# Patient Record
Sex: MALE | Race: WHITE | NOT HISPANIC OR LATINO | Employment: FULL TIME | URBAN - METROPOLITAN AREA
[De-identification: names, ages, dates, MRNs, and addresses within clinical notes are randomized per-mention and may not be internally consistent; named-entity substitution may affect disease eponyms.]

---

## 2023-02-21 PROBLEM — D75.89 MACROCYTOSIS: Status: ACTIVE | Noted: 2023-02-21

## 2023-02-21 PROBLEM — J32.9 CHRONIC SINUSITIS: Status: ACTIVE | Noted: 2023-02-21

## 2023-02-21 PROBLEM — R73.01 IFG (IMPAIRED FASTING GLUCOSE): Status: ACTIVE | Noted: 2023-02-21

## 2023-02-21 PROBLEM — I49.3 PVC (PREMATURE VENTRICULAR CONTRACTION): Status: ACTIVE | Noted: 2023-02-21

## 2023-02-21 PROBLEM — E78.2 HYPERLIPIDEMIA, MIXED: Status: ACTIVE | Noted: 2023-02-21

## 2023-02-21 PROBLEM — N52.9 ERECTILE DYSFUNCTION: Status: ACTIVE | Noted: 2023-02-21

## 2023-02-21 PROBLEM — J45.990 EXERCISE-INDUCED ASTHMA (HHS-HCC): Status: ACTIVE | Noted: 2023-02-21

## 2023-02-21 PROBLEM — K64.0 GRADE I HEMORRHOIDS: Status: ACTIVE | Noted: 2023-02-21

## 2023-02-21 PROBLEM — J30.9 ALLERGIC RHINITIS: Status: ACTIVE | Noted: 2023-02-21

## 2023-02-21 PROBLEM — G47.33 MILD OBSTRUCTIVE SLEEP APNEA: Status: ACTIVE | Noted: 2023-02-21

## 2023-02-21 PROBLEM — I47.10 PSVT (PAROXYSMAL SUPRAVENTRICULAR TACHYCARDIA) (CMS-HCC): Status: ACTIVE | Noted: 2023-02-21

## 2023-02-21 PROBLEM — E55.9 VITAMIN D DEFICIENCY: Status: ACTIVE | Noted: 2023-02-21

## 2023-02-21 PROBLEM — R93.1 ELEVATED CORONARY ARTERY CALCIUM SCORE: Status: ACTIVE | Noted: 2023-02-21

## 2023-02-21 PROBLEM — I10 HYPERTENSION, ESSENTIAL: Status: ACTIVE | Noted: 2023-02-21

## 2023-02-21 RX ORDER — NEBIVOLOL 5 MG/1
1 TABLET ORAL DAILY
COMMUNITY
End: 2024-02-09

## 2023-02-21 RX ORDER — ATORVASTATIN CALCIUM 20 MG/1
1 TABLET, FILM COATED ORAL DAILY
COMMUNITY
Start: 2019-10-10 | End: 2024-03-01 | Stop reason: SDUPTHER

## 2023-02-21 RX ORDER — SILDENAFIL 25 MG/1
1 TABLET, FILM COATED ORAL DAILY PRN
COMMUNITY
End: 2024-04-24

## 2023-02-21 RX ORDER — MONTELUKAST SODIUM 10 MG/1
1 TABLET ORAL DAILY
COMMUNITY
Start: 2016-01-16 | End: 2023-09-16 | Stop reason: SDUPTHER

## 2023-02-21 RX ORDER — VIT C/E/ZN/COPPR/LUTEIN/ZEAXAN 250MG-90MG
2 CAPSULE ORAL DAILY
COMMUNITY
Start: 2018-10-02

## 2023-02-21 RX ORDER — FLUTICASONE PROPIONATE 50 MCG
1 SPRAY, SUSPENSION (ML) NASAL DAILY
COMMUNITY
Start: 2018-10-02

## 2023-02-21 RX ORDER — ACETAMINOPHEN, DEXTROMETHORPHAN HBR, DOXYLAMINE SUCCINATE, PHENYLEPHRINE HCL 650; 20; 12.5; 1 MG/30ML; MG/30ML; MG/30ML; MG/30ML
1 SOLUTION ORAL DAILY
COMMUNITY
Start: 2018-10-02

## 2023-02-21 RX ORDER — ALBUTEROL SULFATE 90 UG/1
2 AEROSOL, METERED RESPIRATORY (INHALATION)
COMMUNITY
Start: 2016-01-16 | End: 2023-09-12 | Stop reason: SDUPTHER

## 2023-02-21 RX ORDER — METOPROLOL SUCCINATE 50 MG/1
0.5 TABLET, EXTENDED RELEASE ORAL
COMMUNITY
End: 2023-03-30 | Stop reason: ALTCHOICE

## 2023-03-30 ENCOUNTER — OFFICE VISIT (OUTPATIENT)
Dept: PRIMARY CARE | Facility: CLINIC | Age: 53
End: 2023-03-30
Payer: COMMERCIAL

## 2023-03-30 VITALS
SYSTOLIC BLOOD PRESSURE: 130 MMHG | HEART RATE: 57 BPM | WEIGHT: 222 LBS | OXYGEN SATURATION: 98 % | BODY MASS INDEX: 29.29 KG/M2 | DIASTOLIC BLOOD PRESSURE: 84 MMHG | TEMPERATURE: 97.2 F

## 2023-03-30 DIAGNOSIS — N52.9 ERECTILE DYSFUNCTION, UNSPECIFIED ERECTILE DYSFUNCTION TYPE: ICD-10-CM

## 2023-03-30 DIAGNOSIS — R73.01 IFG (IMPAIRED FASTING GLUCOSE): ICD-10-CM

## 2023-03-30 DIAGNOSIS — I10 HYPERTENSION, ESSENTIAL: ICD-10-CM

## 2023-03-30 DIAGNOSIS — E55.9 VITAMIN D DEFICIENCY: ICD-10-CM

## 2023-03-30 DIAGNOSIS — E78.2 HYPERLIPIDEMIA, MIXED: Primary | ICD-10-CM

## 2023-03-30 PROBLEM — Z71.89 CARDIAC RISK COUNSELING: Status: ACTIVE | Noted: 2023-03-30

## 2023-03-30 PROBLEM — Z00.00 ROUTINE HEALTH MAINTENANCE: Status: ACTIVE | Noted: 2023-03-30

## 2023-03-30 PROCEDURE — 1036F TOBACCO NON-USER: CPT | Performed by: NURSE PRACTITIONER

## 2023-03-30 PROCEDURE — 3079F DIAST BP 80-89 MM HG: CPT | Performed by: NURSE PRACTITIONER

## 2023-03-30 PROCEDURE — 99214 OFFICE O/P EST MOD 30 MIN: CPT | Performed by: NURSE PRACTITIONER

## 2023-03-30 PROCEDURE — 3075F SYST BP GE 130 - 139MM HG: CPT | Performed by: NURSE PRACTITIONER

## 2023-03-30 ASSESSMENT — PATIENT HEALTH QUESTIONNAIRE - PHQ9
2. FEELING DOWN, DEPRESSED OR HOPELESS: NOT AT ALL
1. LITTLE INTEREST OR PLEASURE IN DOING THINGS: NOT AT ALL
SUM OF ALL RESPONSES TO PHQ9 QUESTIONS 1 AND 2: 0

## 2023-03-30 NOTE — PROGRESS NOTES
"Subjective   Patient ID: Tha Short is a 53 y.o. male who presents for Follow-up (Follow up visit/Had labs/mri/Brought bp machine from home/Feeling good/ /Patient brought his machine from home to compare/Our machine 130/84 P 57/His machine  132/87 P57/).  HPI  Home readings usually lower at home  127/84  127/81  HR 50s  Feeling good on change to bystolic    Bystolic am  Checking in evenings  Drinks a lot of coffee 4 cups daily   Trying to cut down on red meat  Travels a lot  - restaurant food  Needs to restart exercise     Review of Systems   All other systems reviewed and are negative.      BP Readings from Last 3 Encounters:   03/30/23 130/84   06/24/22 127/87   12/01/21 122/75      Wt Readings from Last 3 Encounters:   03/30/23 101 kg (222 lb)   06/24/22 96.2 kg (212 lb)   05/20/22 93 kg (205 lb)      BMI: Estimated body mass index is 29.29 kg/m² as calculated from the following:    Height as of 6/24/22: 1.854 m (6' 1\").    Weight as of this encounter: 101 kg (222 lb).    Component      Latest Ref Rng 10/26/2022   WBC      4.4 - 11.3 x10E9/L 6.2    RBC      4.50 - 5.90 x10E12/L 4.52    HEMOGLOBIN      13.5 - 17.5 g/dL 15.1    HEMATOCRIT      41.0 - 52.0 % 46.2    MCV      80 - 100 fL 102 (H)    MCHC      32.0 - 36.0 g/dL 32.7    Platelets      150 - 450 x10E9/L 221    RED CELL DISTRIBUTION WIDTH      11.5 - 14.5 % 12.2    Neutrophils %      40.0 - 80.0 % 60.1    Immature Granulocytes %, Automated      0.0 - 0.9 % 0.5    Lymphocytes %      13.0 - 44.0 % 23.1    Monocytes %      2.0 - 10.0 % 12.2    Eosinophils %      0.0 - 6.0 % 3.0    Basophils %      0.0 - 2.0 % 1.1    Neutrophils Absolute      1.20 - 7.70 x10E9/L 3.74    Lymphocytes Absolute      1.20 - 4.80 x10E9/L 1.44    Monocytes Absolute      0.10 - 1.00 x10E9/L 0.76    Eosinophils Absolute      0.00 - 0.70 x10E9/L 0.19    Basophils Absolute      0.00 - 0.10 x10E9/L 0.07    GLUCOSE      74 - 99 mg/dL 89    SODIUM      136 - 145 mmol/L 137    POTASSIUM     "  3.5 - 5.3 mmol/L 4.4    CHLORIDE      98 - 107 mmol/L 104    Bicarbonate      21 - 32 mmol/L 27    Anion Gap      10 - 20 mmol/L 10    Blood Urea Nitrogen      6 - 23 mg/dL 14    Creatinine      0.50 - 1.30 mg/dL 1.03    GFR MALE      >90 mL/min/1.73m2 87    Calcium      8.6 - 10.3 mg/dL 9.2    Albumin      3.4 - 5.0 g/dL 4.1    Alkaline Phosphatase      33 - 120 U/L 67    Total Protein      6.4 - 8.2 g/dL 6.5    AST      9 - 39 U/L 27    Bilirubin Total      0.0 - 1.2 mg/dL 0.6    ALT      10 - 52 U/L 26    Color, Urine      STRAW,YELLOW  STRAW    Appearance, Urine      CLEAR  CLEAR    Specific Gravity, Urine      1.005 - 1.035  1.008    pH, Urine      5.0 - 8.0  6.0    Protein, Urine      NEGATIVE mg/dL NEGATIVE    Glucose, Urine      NEGATIVE mg/dL NEGATIVE    Blood, Urine      NEGATIVE  NEGATIVE    Ketones, Urine      NEGATIVE mg/dL NEGATIVE    Bilirubin, Urine      NEGATIVE  NEGATIVE    Urobilinogen, Urine      0.0 - 1.9 mg/dL <2.0    Nitrite, Urine      NEGATIVE  NEGATIVE    Leukocyte Esterase, Urine      NEGATIVE  NEGATIVE    CHOLESTEROL      0 - 199 mg/dL 126    HDL CHOLESTEROL      mg/dL 47.0    Cholesterol/HDL Ratio 2.7    LDL      0 - 99 mg/dL 53    VLDL      0 - 40 mg/dL 26    TRIGLYCERIDES      0 - 149 mg/dL 130    ALBUMIN (MG/L) IN URINE      Not Established mg/L <7.0    Albumin/Creatine Ratio      0.0 - 30.0 ug/mg crt SEE COMMENT    Creatinine, Urine Random      20.0 - 370.0 mg/dL 57.0    Hemoglobin A1C      % 5.2    Estimated Average Glucose      MG/    Thyroxine, Free      0.61 - 1.12 ng/dL 0.87    Thyroid Stimulating Hormone      0.44 - 3.98 mIU/L 2.76    Triiodothyronine, Free      2.3 - 4.2 pg/mL 3.6    Vitamin D, 25-Hydroxy, Total      ng/mL 49    Prostate Specific Antigen,Screen      0.00 - 4.00 ng/mL    Vitamin B12      211 - 911 pg/mL    FOLATE      >5.0 ng/mL      Component      Latest Ref Rng 2/15/2023   WBC      4.4 - 11.3 x10E9/L 7.9    RBC      4.50 - 5.90 x10E12/L 4.33 (L)     HEMOGLOBIN      13.5 - 17.5 g/dL 14.5    HEMATOCRIT      41.0 - 52.0 % 44.4    MCV      80 - 100 fL 103 (H)    MCHC      32.0 - 36.0 g/dL 32.7    Platelets      150 - 450 x10E9/L 265    RED CELL DISTRIBUTION WIDTH      11.5 - 14.5 % 12.1    Neutrophils %      40.0 - 80.0 % 64.0    Immature Granulocytes %, Automated      0.0 - 0.9 % 0.4    Lymphocytes %      13.0 - 44.0 % 22.4    Monocytes %      2.0 - 10.0 % 10.0    Eosinophils %      0.0 - 6.0 % 2.4    Basophils %      0.0 - 2.0 % 0.8    Neutrophils Absolute      1.20 - 7.70 x10E9/L 5.08    Lymphocytes Absolute      1.20 - 4.80 x10E9/L 1.78    Monocytes Absolute      0.10 - 1.00 x10E9/L 0.79    Eosinophils Absolute      0.00 - 0.70 x10E9/L 0.19    Basophils Absolute      0.00 - 0.10 x10E9/L 0.06    GLUCOSE      74 - 99 mg/dL    SODIUM      136 - 145 mmol/L    POTASSIUM      3.5 - 5.3 mmol/L    CHLORIDE      98 - 107 mmol/L    Bicarbonate      21 - 32 mmol/L    Anion Gap      10 - 20 mmol/L    Blood Urea Nitrogen      6 - 23 mg/dL    Creatinine      0.50 - 1.30 mg/dL    GFR MALE      >90 mL/min/1.73m2    Calcium      8.6 - 10.3 mg/dL    Albumin      3.4 - 5.0 g/dL    Alkaline Phosphatase      33 - 120 U/L    Total Protein      6.4 - 8.2 g/dL    AST      9 - 39 U/L    Bilirubin Total      0.0 - 1.2 mg/dL    ALT      10 - 52 U/L    Color, Urine      STRAW,YELLOW     Appearance, Urine      CLEAR     Specific Gravity, Urine      1.005 - 1.035     pH, Urine      5.0 - 8.0     Protein, Urine      NEGATIVE mg/dL    Glucose, Urine      NEGATIVE mg/dL    Blood, Urine      NEGATIVE     Ketones, Urine      NEGATIVE mg/dL    Bilirubin, Urine      NEGATIVE     Urobilinogen, Urine      0.0 - 1.9 mg/dL    Nitrite, Urine      NEGATIVE     Leukocyte Esterase, Urine      NEGATIVE     CHOLESTEROL      0 - 199 mg/dL    HDL CHOLESTEROL      mg/dL    Cholesterol/HDL Ratio    LDL      0 - 99 mg/dL    VLDL      0 - 40 mg/dL    TRIGLYCERIDES      0 - 149 mg/dL    ALBUMIN (MG/L) IN URINE       Not Established mg/L    Albumin/Creatine Ratio      0.0 - 30.0 ug/mg crt    Creatinine, Urine Random      20.0 - 370.0 mg/dL    Hemoglobin A1C      %    Estimated Average Glucose      MG/DL    Thyroxine, Free      0.61 - 1.12 ng/dL    Thyroid Stimulating Hormone      0.44 - 3.98 mIU/L    Triiodothyronine, Free      2.3 - 4.2 pg/mL    Vitamin D, 25-Hydroxy, Total      ng/mL    Prostate Specific Antigen,Screen      0.00 - 4.00 ng/mL 0.27    Vitamin B12      211 - 911 pg/mL 907    FOLATE      >5.0 ng/mL 18.0         Objective   Physical Exam  Constitutional:       Appearance: Normal appearance.   HENT:      Head: Normocephalic and atraumatic.   Eyes:      Conjunctiva/sclera: Conjunctivae normal.   Cardiovascular:      Rate and Rhythm: Normal rate and regular rhythm.      Heart sounds: Normal heart sounds.   Pulmonary:      Effort: Pulmonary effort is normal.      Breath sounds: Normal breath sounds.   Abdominal:      Palpations: Abdomen is soft.   Musculoskeletal:         General: Normal range of motion.      Cervical back: Normal range of motion.   Skin:     General: Skin is warm and dry.   Neurological:      General: No focal deficit present.      Mental Status: He is alert.   Psychiatric:         Mood and Affect: Mood normal.         Assessment/Plan   Problem List Items Addressed This Visit       Erectile dysfunction    Hyperlipidemia, mixed    Hypertension, essential    IFG (impaired fasting glucose)    Routine health maintenance - Primary     Cscope: 2019 (5y)         Vitamin D deficiency

## 2023-03-30 NOTE — ASSESSMENT & PLAN NOTE
Home cuff checked  Home readings reviewed  Advised cut down on caffeine intake  Cont bystolic and monitor

## 2023-05-18 DIAGNOSIS — K57.90 DIVERTICULOSIS: Primary | ICD-10-CM

## 2023-05-18 RX ORDER — AMOXICILLIN AND CLAVULANATE POTASSIUM 875; 125 MG/1; MG/1
875 TABLET, FILM COATED ORAL 2 TIMES DAILY
Qty: 14 TABLET | Refills: 1 | Status: SHIPPED | OUTPATIENT
Start: 2023-05-18 | End: 2023-05-25

## 2023-07-11 ENCOUNTER — TELEPHONE (OUTPATIENT)
Dept: PRIMARY CARE | Facility: CLINIC | Age: 53
End: 2023-07-11
Payer: COMMERCIAL

## 2023-07-11 NOTE — TELEPHONE ENCOUNTER
Type of form: Physicians Order CPAP supplies  Received from: [employer] via fax for completion and provider's signature   Fax to [phone]  Form placed in PCP box front office.

## 2023-07-27 ENCOUNTER — TELEPHONE (OUTPATIENT)
Dept: PRIMARY CARE | Facility: CLINIC | Age: 53
End: 2023-07-27

## 2023-07-27 ENCOUNTER — TELEMEDICINE (OUTPATIENT)
Dept: PRIMARY CARE | Facility: CLINIC | Age: 53
End: 2023-07-27
Payer: COMMERCIAL

## 2023-07-27 VITALS — WEIGHT: 205 LBS | DIASTOLIC BLOOD PRESSURE: 70 MMHG | BODY MASS INDEX: 27.8 KG/M2 | SYSTOLIC BLOOD PRESSURE: 130 MMHG

## 2023-07-27 DIAGNOSIS — H10.32 ACUTE BACTERIAL CONJUNCTIVITIS OF LEFT EYE: Primary | ICD-10-CM

## 2023-07-27 PROCEDURE — 99213 OFFICE O/P EST LOW 20 MIN: CPT | Performed by: NURSE PRACTITIONER

## 2023-07-27 RX ORDER — POLYMYXIN B SULFATE AND TRIMETHOPRIM 1; 10000 MG/ML; [USP'U]/ML
1 SOLUTION OPHTHALMIC
Qty: 10 ML | Refills: 0 | Status: SHIPPED | OUTPATIENT
Start: 2023-07-27 | End: 2023-08-03

## 2023-07-27 ASSESSMENT — PATIENT HEALTH QUESTIONNAIRE - PHQ9
2. FEELING DOWN, DEPRESSED OR HOPELESS: NOT AT ALL
SUM OF ALL RESPONSES TO PHQ9 QUESTIONS 1 AND 2: 0
1. LITTLE INTEREST OR PLEASURE IN DOING THINGS: NOT AT ALL

## 2023-07-27 NOTE — PROGRESS NOTES
Problem List Items Addressed This Visit    None     An interactive audio/visual telecommunication system which permits real time communications between the patient (at the originating site) and provider (at the distant site) was utilized to provide this telehealth service.    Verbal consent was requested and obtained from the patient for this telehealth visit.  We have confirmed the patient wishes to see me, Lilo Srinivasan, a board certified family nurse practitioner with an active [facility] license as well as verified the patient's identity and physical location in [place].    Subjective   Patient ID: Tha Short is a 53 y.o. male who presents for URI (Allergy symptoms/Eyes started itching yesterday, putting otc drops in/Woke up this morning eye was crusty, has drainage, does not hurt).  URI     Conjunctivitis   Associated symptoms include URI.     OTC drops Clear Eye last night  L eye improved after drops  L > R  Itchy dull pain  Entire L sclera red this morning  Yellowish/brown drainage this morning   Review of Systems   All other systems reviewed and are negative.      BP Readings from Last 3 Encounters:   07/27/23 130/70   03/30/23 130/84   02/16/23 144/86      Wt Readings from Last 3 Encounters:   07/27/23 93 kg (205 lb)   03/30/23 101 kg (222 lb)   02/16/23 98.4 kg (217 lb)      BMI:   Estimated body mass index is 27.8 kg/m² as calculated from the following:    Height as of 2/16/23: 1.829 m (6').    Weight as of this encounter: 93 kg (205 lb).    Objective   Physical Exam  Gen: Alert, NAD  Respiratory:  resp effort NL  Neuro:  coordination intact   Mood: normal    Eye exam limited; L medial sclera appears erythematous

## 2023-07-27 NOTE — TELEPHONE ENCOUNTER
----- Message from NIMCO Medina sent at 7/27/2023  7:36 AM EDT -----  Regarding: FW: Your Recent Visit  Contact: [phone]  Office policy minimum VV for new RX. Can schedule with me this morning   ----- Message -----  From: Tha Short  Sent: 7/27/2023   6:37 AM EDT  To: #  Subject: Your Recent Visit                                Lilo,  I think I have pink eye is there a way you can call in prescription drops for me, thank you?  Tha    
Patient scheduled for VV with NP  
Jay Abrams)  Neurology; Neuromuscular Medicine  [address] [place] [address], [address]  [place], [place] [id number]  Phone: [phone]  Fax: [phone]  Follow Up Time: 1 week

## 2023-09-12 ENCOUNTER — PATIENT MESSAGE (OUTPATIENT)
Dept: PRIMARY CARE | Facility: CLINIC | Age: 53
End: 2023-09-12
Payer: COMMERCIAL

## 2023-09-12 DIAGNOSIS — J45.990 EXERCISE-INDUCED ASTHMA (HHS-HCC): ICD-10-CM

## 2023-09-12 DIAGNOSIS — K21.9 GERD WITHOUT ESOPHAGITIS: ICD-10-CM

## 2023-09-12 RX ORDER — OMEPRAZOLE 20 MG/1
20 CAPSULE, DELAYED RELEASE ORAL DAILY
Qty: 30 CAPSULE | Refills: 11 | Status: SHIPPED | OUTPATIENT
Start: 2023-09-12 | End: 2023-09-12 | Stop reason: SDUPTHER

## 2023-09-12 RX ORDER — OMEPRAZOLE 20 MG/1
20 CAPSULE, DELAYED RELEASE ORAL DAILY
COMMUNITY
Start: 2023-07-27 | End: 2023-09-12 | Stop reason: SDUPTHER

## 2023-09-12 RX ORDER — ALBUTEROL SULFATE 90 UG/1
AEROSOL, METERED RESPIRATORY (INHALATION)
Qty: 18 G | Refills: 3 | Status: SHIPPED | OUTPATIENT
Start: 2023-09-12 | End: 2024-04-24 | Stop reason: SDUPTHER

## 2023-09-12 RX ORDER — OMEPRAZOLE 20 MG/1
20 CAPSULE, DELAYED RELEASE ORAL DAILY
Qty: 90 CAPSULE | Refills: 3 | Status: SHIPPED | OUTPATIENT
Start: 2023-09-12

## 2023-09-14 ENCOUNTER — LAB (OUTPATIENT)
Dept: LAB | Facility: LAB | Age: 53
End: 2023-09-14
Payer: COMMERCIAL

## 2023-09-14 DIAGNOSIS — E55.9 VITAMIN D DEFICIENCY: ICD-10-CM

## 2023-09-14 DIAGNOSIS — E78.2 HYPERLIPIDEMIA, MIXED: ICD-10-CM

## 2023-09-14 DIAGNOSIS — R79.89 ELEVATED TSH: Primary | ICD-10-CM

## 2023-09-14 DIAGNOSIS — R73.01 IFG (IMPAIRED FASTING GLUCOSE): ICD-10-CM

## 2023-09-14 DIAGNOSIS — I10 HYPERTENSION, ESSENTIAL: ICD-10-CM

## 2023-09-14 LAB
ALANINE AMINOTRANSFERASE (SGPT) (U/L) IN SER/PLAS: 26 U/L (ref 10–52)
ALBUMIN (G/DL) IN SER/PLAS: 4.2 G/DL (ref 3.4–5)
ALKALINE PHOSPHATASE (U/L) IN SER/PLAS: 59 U/L (ref 33–120)
ANION GAP IN SER/PLAS: 11 MMOL/L (ref 10–20)
APPEARANCE, URINE: CLEAR
ASPARTATE AMINOTRANSFERASE (SGOT) (U/L) IN SER/PLAS: 30 U/L (ref 9–39)
BASOPHILS (10*3/UL) IN BLOOD BY AUTOMATED COUNT: 0.05 X10E9/L (ref 0–0.1)
BASOPHILS/100 LEUKOCYTES IN BLOOD BY AUTOMATED COUNT: 0.9 % (ref 0–2)
BILIRUBIN TOTAL (MG/DL) IN SER/PLAS: 0.7 MG/DL (ref 0–1.2)
BILIRUBIN, URINE: NEGATIVE
BLOOD, URINE: NEGATIVE
CALCIUM (MG/DL) IN SER/PLAS: 9.4 MG/DL (ref 8.6–10.3)
CARBON DIOXIDE, TOTAL (MMOL/L) IN SER/PLAS: 28 MMOL/L (ref 21–32)
CHLORIDE (MMOL/L) IN SER/PLAS: 103 MMOL/L (ref 98–107)
CHOLESTEROL (MG/DL) IN SER/PLAS: 124 MG/DL (ref 0–199)
CHOLESTEROL IN HDL (MG/DL) IN SER/PLAS: 39.2 MG/DL
CHOLESTEROL/HDL RATIO: 3.2
COLOR, URINE: YELLOW
CREATININE (MG/DL) IN SER/PLAS: 1.06 MG/DL (ref 0.5–1.3)
EOSINOPHILS (10*3/UL) IN BLOOD BY AUTOMATED COUNT: 0.18 X10E9/L (ref 0–0.7)
EOSINOPHILS/100 LEUKOCYTES IN BLOOD BY AUTOMATED COUNT: 3.1 % (ref 0–6)
ERYTHROCYTE DISTRIBUTION WIDTH (RATIO) BY AUTOMATED COUNT: 11.6 % (ref 11.5–14.5)
ERYTHROCYTE MEAN CORPUSCULAR HEMOGLOBIN CONCENTRATION (G/DL) BY AUTOMATED: 32.8 G/DL (ref 32–36)
ERYTHROCYTE MEAN CORPUSCULAR VOLUME (FL) BY AUTOMATED COUNT: 100 FL (ref 80–100)
ERYTHROCYTES (10*6/UL) IN BLOOD BY AUTOMATED COUNT: 4.59 X10E12/L (ref 4.5–5.9)
GFR MALE: 84 ML/MIN/1.73M2
GLUCOSE (MG/DL) IN SER/PLAS: 83 MG/DL (ref 74–99)
GLUCOSE, URINE: NEGATIVE MG/DL
HEMATOCRIT (%) IN BLOOD BY AUTOMATED COUNT: 46.1 % (ref 41–52)
HEMOGLOBIN (G/DL) IN BLOOD: 15.1 G/DL (ref 13.5–17.5)
IMMATURE GRANULOCYTES/100 LEUKOCYTES IN BLOOD BY AUTOMATED COUNT: 0.3 % (ref 0–0.9)
KETONES, URINE: NEGATIVE MG/DL
LDL: 60 MG/DL (ref 0–99)
LEUKOCYTE ESTERASE, URINE: NEGATIVE
LEUKOCYTES (10*3/UL) IN BLOOD BY AUTOMATED COUNT: 5.9 X10E9/L (ref 4.4–11.3)
LYMPHOCYTES (10*3/UL) IN BLOOD BY AUTOMATED COUNT: 1.6 X10E9/L (ref 1.2–4.8)
LYMPHOCYTES/100 LEUKOCYTES IN BLOOD BY AUTOMATED COUNT: 27.2 % (ref 13–44)
MONOCYTES (10*3/UL) IN BLOOD BY AUTOMATED COUNT: 0.68 X10E9/L (ref 0.1–1)
MONOCYTES/100 LEUKOCYTES IN BLOOD BY AUTOMATED COUNT: 11.6 % (ref 2–10)
NEUTROPHILS (10*3/UL) IN BLOOD BY AUTOMATED COUNT: 3.35 X10E9/L (ref 1.2–7.7)
NEUTROPHILS/100 LEUKOCYTES IN BLOOD BY AUTOMATED COUNT: 56.9 % (ref 40–80)
NITRITE, URINE: NEGATIVE
PH, URINE: 6 (ref 5–8)
PLATELETS (10*3/UL) IN BLOOD AUTOMATED COUNT: 238 X10E9/L (ref 150–450)
POTASSIUM (MMOL/L) IN SER/PLAS: 3.9 MMOL/L (ref 3.5–5.3)
PROTEIN TOTAL: 6.8 G/DL (ref 6.4–8.2)
PROTEIN, URINE: NEGATIVE MG/DL
SODIUM (MMOL/L) IN SER/PLAS: 138 MMOL/L (ref 136–145)
SPECIFIC GRAVITY, URINE: 1.01 (ref 1–1.03)
THYROTROPIN (MIU/L) IN SER/PLAS BY DETECTION LIMIT <= 0.05 MIU/L: 4.57 MIU/L (ref 0.44–3.98)
THYROXINE (T4) FREE (NG/DL) IN SER/PLAS: 0.95 NG/DL (ref 0.61–1.12)
TRIGLYCERIDE (MG/DL) IN SER/PLAS: 125 MG/DL (ref 0–149)
UREA NITROGEN (MG/DL) IN SER/PLAS: 14 MG/DL (ref 6–23)
UROBILINOGEN, URINE: <2 MG/DL (ref 0–1.9)
VLDL: 25 MG/DL (ref 0–40)

## 2023-09-14 PROCEDURE — 84443 ASSAY THYROID STIM HORMONE: CPT

## 2023-09-14 PROCEDURE — 84439 ASSAY OF FREE THYROXINE: CPT

## 2023-09-14 PROCEDURE — 85025 COMPLETE CBC W/AUTO DIFF WBC: CPT

## 2023-09-14 PROCEDURE — 82652 VIT D 1 25-DIHYDROXY: CPT

## 2023-09-14 PROCEDURE — 81003 URINALYSIS AUTO W/O SCOPE: CPT

## 2023-09-14 PROCEDURE — 80053 COMPREHEN METABOLIC PANEL: CPT

## 2023-09-14 PROCEDURE — 36415 COLL VENOUS BLD VENIPUNCTURE: CPT

## 2023-09-14 PROCEDURE — 80061 LIPID PANEL: CPT

## 2023-09-16 DIAGNOSIS — J45.990 EXERCISE-INDUCED ASTHMA (HHS-HCC): Primary | ICD-10-CM

## 2023-09-16 RX ORDER — MONTELUKAST SODIUM 10 MG/1
10 TABLET ORAL DAILY
Qty: 90 TABLET | Refills: 3 | Status: SHIPPED | OUTPATIENT
Start: 2023-09-16 | End: 2024-09-15

## 2023-09-18 LAB — VITAMIN D 1,25-DIHYDROXY: 41 PG/ML (ref 19.9–79.3)

## 2023-10-05 ENCOUNTER — TELEMEDICINE (OUTPATIENT)
Dept: PRIMARY CARE | Facility: CLINIC | Age: 53
End: 2023-10-05
Payer: COMMERCIAL

## 2023-10-05 VITALS
TEMPERATURE: 98 F | WEIGHT: 207 LBS | HEIGHT: 73 IN | SYSTOLIC BLOOD PRESSURE: 137 MMHG | HEART RATE: 59 BPM | DIASTOLIC BLOOD PRESSURE: 85 MMHG | BODY MASS INDEX: 27.43 KG/M2

## 2023-10-05 DIAGNOSIS — J06.9 UPPER RESPIRATORY TRACT INFECTION, UNSPECIFIED TYPE: Primary | ICD-10-CM

## 2023-10-05 PROCEDURE — 99213 OFFICE O/P EST LOW 20 MIN: CPT | Performed by: NURSE PRACTITIONER

## 2023-10-05 RX ORDER — AMOXICILLIN AND CLAVULANATE POTASSIUM 875; 125 MG/1; MG/1
875 TABLET, FILM COATED ORAL 2 TIMES DAILY
Qty: 14 TABLET | Refills: 0 | Status: SHIPPED | OUTPATIENT
Start: 2023-10-05 | End: 2023-10-12

## 2023-10-05 ASSESSMENT — PATIENT HEALTH QUESTIONNAIRE - PHQ9
SUM OF ALL RESPONSES TO PHQ9 QUESTIONS 1 AND 2: 0
2. FEELING DOWN, DEPRESSED OR HOPELESS: NOT AT ALL
1. LITTLE INTEREST OR PLEASURE IN DOING THINGS: NOT AT ALL

## 2023-10-05 NOTE — PROGRESS NOTES
"Problem List Items Addressed This Visit    None  Visit Diagnoses       Upper respiratory tract infection, unspecified type    -  Primary    will start augmentin if doesn't begin to improve, or worsens  cont symptomatic care  fu prn    Relevant Medications    amoxicillin-pot clavulanate (Augmentin) 875-125 mg tablet           An interactive audio/visual telecommunication system which permits real time communications between the patient (at the originating site) and provider (at the distant site) was utilized to provide this telehealth service.    Verbal consent was requested and obtained from the patient for this telehealth visit.  We have confirmed the patient wishes to see me, Lilo Srinivasan, a board certified family nurse practitioner with an active [facility] license as well as verified the patient's identity and physical location in [place].    Subjective   Patient ID: Tha Short is a 53 y.o. male who presents for URI (Sx started one week ago/Current sx - cough,yellow colored mucus,pnd/Covid tested- negative x2).  URI       Felt feverish  Chills occ  No sore throat  No dyspnea  Using inhaler  No audible wheeze  Mucinex  Flonase  Sudafed  No v/d  Sx are lingering  Wife with same  Traveling tomorrow     Review of Systems   All other systems reviewed and are negative.      BP Readings from Last 3 Encounters:   10/05/23 137/85   07/27/23 130/70   03/30/23 130/84      Wt Readings from Last 3 Encounters:   10/05/23 93.9 kg (207 lb)   07/27/23 93 kg (205 lb)   03/30/23 101 kg (222 lb)      BMI:   Estimated body mass index is 27.31 kg/m² as calculated from the following:    Height as of this encounter: 1.854 m (6' 1\").    Weight as of this encounter: 93.9 kg (207 lb).    Objective   Physical Exam  Gen: Alert, NAD  Respiratory:  resp effort NL, occ cough, speaking in complete sentences   Neuro:  coordination intact   Mood: normal      "

## 2023-12-15 ENCOUNTER — TELEMEDICINE (OUTPATIENT)
Dept: PRIMARY CARE | Facility: CLINIC | Age: 53
End: 2023-12-15
Payer: COMMERCIAL

## 2023-12-15 VITALS — TEMPERATURE: 103 F

## 2023-12-15 DIAGNOSIS — R51.9 ACUTE INTRACTABLE HEADACHE, UNSPECIFIED HEADACHE TYPE: ICD-10-CM

## 2023-12-15 DIAGNOSIS — J06.9 UPPER RESPIRATORY TRACT INFECTION, UNSPECIFIED TYPE: Primary | ICD-10-CM

## 2023-12-15 PROCEDURE — 99214 OFFICE O/P EST MOD 30 MIN: CPT | Performed by: INTERNAL MEDICINE

## 2023-12-15 RX ORDER — AMOXICILLIN AND CLAVULANATE POTASSIUM 875; 125 MG/1; MG/1
875 TABLET, FILM COATED ORAL 2 TIMES DAILY
Qty: 14 TABLET | Refills: 0 | Status: SHIPPED | OUTPATIENT
Start: 2023-12-15 | End: 2023-12-22

## 2023-12-15 RX ORDER — ACETAMINOPHEN 500 MG
1000 TABLET ORAL EVERY 8 HOURS PRN
Qty: 30 TABLET | Refills: 0
Start: 2023-12-15 | End: 2023-12-25

## 2023-12-15 RX ORDER — OSELTAMIVIR PHOSPHATE 75 MG/1
75 CAPSULE ORAL 2 TIMES DAILY
Qty: 10 CAPSULE | Refills: 0 | Status: SHIPPED | OUTPATIENT
Start: 2023-12-15 | End: 2023-12-20

## 2023-12-15 ASSESSMENT — PATIENT HEALTH QUESTIONNAIRE - PHQ9
1. LITTLE INTEREST OR PLEASURE IN DOING THINGS: NOT AT ALL
2. FEELING DOWN, DEPRESSED OR HOPELESS: NOT AT ALL
SUM OF ALL RESPONSES TO PHQ9 QUESTIONS 1 AND 2: 0

## 2023-12-15 NOTE — PROGRESS NOTES
An interactive telecommunication system which permits real time communications between the patient (at the originating site) and provider (at the distant site) was utilized to provide this telehealth service.    Verbal consent was requested and obtained from the patient for this telehealth visit.    We have confirmed the patient wishes to see me, Dr. Stephanie Oconnor, a board certified Internal Medicine physician with an active [place] medical license as well as verified the patient's identity and physical location in [place].  Audio and Visual both.  Chief Complaint/HPI:  Flu Symptoms (SX: started Wednesday cold (-) COVID; Thursday rough and then last night fever 102- 103; chills, headache, general aches and pains)  No flu vaccine  Took 2 covid tests, one Wednesday (2 days ago) and one today  On travel  Worse HA he has ever had but feels is a little better now than last night  No neuro symptoms  Eyes feel burning  Has had both contacts w/flu and covid  Boss cannot get out of bed, same symptoms, they traveled from [facility] last week  Sinuses going crazy, chronic sinus infections  Tolerates augmentin, tamiflu in past   Pressure makes feel like head will explode  Every bone in body aches  No ST      ASSESSMENT/PLAN  Problem List Items Addressed This Visit    None  Visit Diagnoses       Upper respiratory tract infection, unspecified type    -  Primary    COVID (-) x 2  ?FLU?  ER /CT brain if HA worsens  Rapid Flu swab if able, suspect this is cause  Will do empiricTamiflu if cant test, Tylenol  Augmenin if needs    Relevant Medications    oseltamivir (Tamiflu) 75 mg capsule    acetaminophen (Tylenol Extra Strength) 500 mg tablet    amoxicillin-pot clavulanate (Augmentin) 875-125 mg tablet    Acute intractable headache, unspecified headache type        Feeling better and likely from viral illness  Always concerned w/'worst HA of life'  Discussed ER, def if worsens any              Patient Active Problem List   Diagnosis    Allergic  rhinitis    Chronic sinusitis    Exercise-induced asthma    Grade I hemorrhoids    Hyperlipidemia, mixed    Hypertension, essential    IFG (impaired fasting glucose)    Macrocytosis    Mild obstructive sleep apnea    PSVT (paroxysmal supraventricular tachycardia)    Vitamin D deficiency    Elevated coronary artery calcium score    Erectile dysfunction    PVC (premature ventricular contraction)    Routine health maintenance    Cardiac risk counseling    Diverticulosis    GERD without esophagitis       ALLERGIES  Lisinopril    MEDICATIONS  Current Outpatient Medications   Medication Instructions    acetaminophen (TYLENOL EXTRA STRENGTH) 1,000 mg, oral, Every 8 hours PRN    albuterol 90 mcg/actuation inhaler EVERY 4- 6 HOURS AS NEEDED FOR WHEEZING/SHORTNESS OF BREATh    amoxicillin-pot clavulanate (Augmentin) 875-125 mg tablet 875 mg, oral, 2 times daily    atorvastatin (Lipitor) 20 mg tablet 1 tablet, oral, Daily    cetirizine (ZYRTEC) 10 mg capsule 1 tablet, oral, Daily    cholecalciferol (Vitamin D-3) 25 MCG (1000 UT) capsule 2 tablets, oral, Daily    cyanocobalamin, vitamin B-12, (Vitamin B-12) 1,000 mcg tablet extended release 1 tablet, oral, Daily, As directed    docosahexaenoic acid/epa (FISH OIL ORAL) 1,000 mg, oral, Daily PRN, 1 Capsule daily    fluticasone (Flonase) 50 mcg/actuation nasal spray 1 spray, nasal, Daily    montelukast (SINGULAIR) 10 mg, oral, Daily, As directed    nebivolol (Bystolic) 5 mg tablet 1 tablet, oral, Daily    omeprazole (PRILOSEC) 20 mg, oral, Daily    oseltamivir (TAMIFLU) 75 mg, oral, 2 times daily    sildenafil (Viagra) 25 mg tablet 1 tablet, oral, Daily PRN, 1 hour before needed        ROS otherwise negative aside from what was mentioned above in HPI.    PHYSICAL EXAM  Temp 39.4 °C (103 °F)   Gen: Alert, NAD  HEENT:  PERRLA, EOMI, conjunctiva and sclera normal in appearance  Skin:  No suspicious lesions present      Time spent prepping/preparing for visit as well as pre/post-visit  charting: 10 minutes  Time spent directly with Patient: 22 minutes  Total Time: 32 minutes

## 2024-02-08 DIAGNOSIS — I10 ESSENTIAL (PRIMARY) HYPERTENSION: ICD-10-CM

## 2024-02-09 RX ORDER — NEBIVOLOL 5 MG/1
5 TABLET ORAL DAILY
Qty: 90 TABLET | Refills: 3 | Status: SHIPPED | OUTPATIENT
Start: 2024-02-09

## 2024-02-27 ENCOUNTER — LAB (OUTPATIENT)
Dept: LAB | Facility: LAB | Age: 54
End: 2024-02-27
Payer: COMMERCIAL

## 2024-02-27 DIAGNOSIS — R79.89 ELEVATED TSH: ICD-10-CM

## 2024-02-27 LAB — TSH SERPL-ACNC: 3.8 MIU/L (ref 0.44–3.98)

## 2024-02-27 PROCEDURE — 36415 COLL VENOUS BLD VENIPUNCTURE: CPT

## 2024-02-27 PROCEDURE — 84443 ASSAY THYROID STIM HORMONE: CPT

## 2024-03-01 ENCOUNTER — OFFICE VISIT (OUTPATIENT)
Dept: PRIMARY CARE | Facility: CLINIC | Age: 54
End: 2024-03-01
Payer: COMMERCIAL

## 2024-03-01 ENCOUNTER — HOSPITAL ENCOUNTER (OUTPATIENT)
Dept: RADIOLOGY | Facility: CLINIC | Age: 54
Discharge: HOME | End: 2024-03-01
Payer: COMMERCIAL

## 2024-03-01 VITALS
OXYGEN SATURATION: 99 % | HEIGHT: 73 IN | BODY MASS INDEX: 28.1 KG/M2 | TEMPERATURE: 97.6 F | HEART RATE: 71 BPM | SYSTOLIC BLOOD PRESSURE: 120 MMHG | WEIGHT: 212 LBS | DIASTOLIC BLOOD PRESSURE: 72 MMHG

## 2024-03-01 DIAGNOSIS — J45.990 EXERCISE-INDUCED ASTHMA (HHS-HCC): ICD-10-CM

## 2024-03-01 DIAGNOSIS — Z12.5 SCREENING FOR PROSTATE CANCER: ICD-10-CM

## 2024-03-01 DIAGNOSIS — Z13.6 SCREENING FOR CARDIOVASCULAR CONDITION: ICD-10-CM

## 2024-03-01 DIAGNOSIS — E78.2 HYPERLIPIDEMIA, MIXED: ICD-10-CM

## 2024-03-01 DIAGNOSIS — E55.9 VITAMIN D DEFICIENCY: ICD-10-CM

## 2024-03-01 DIAGNOSIS — Z00.00 ROUTINE HEALTH MAINTENANCE: Primary | ICD-10-CM

## 2024-03-01 DIAGNOSIS — R93.1 ABNORMAL SCREENING CARDIAC CT: ICD-10-CM

## 2024-03-01 DIAGNOSIS — K57.90 DIVERTICULOSIS: ICD-10-CM

## 2024-03-01 DIAGNOSIS — Z12.11 SCREENING FOR COLON CANCER: ICD-10-CM

## 2024-03-01 DIAGNOSIS — M54.50 RIGHT LUMBAR PAIN: ICD-10-CM

## 2024-03-01 DIAGNOSIS — I10 HYPERTENSION, ESSENTIAL: ICD-10-CM

## 2024-03-01 PROCEDURE — 1036F TOBACCO NON-USER: CPT | Performed by: INTERNAL MEDICINE

## 2024-03-01 PROCEDURE — 99396 PREV VISIT EST AGE 40-64: CPT | Performed by: INTERNAL MEDICINE

## 2024-03-01 PROCEDURE — 72100 X-RAY EXAM L-S SPINE 2/3 VWS: CPT | Performed by: RADIOLOGY

## 2024-03-01 PROCEDURE — 72100 X-RAY EXAM L-S SPINE 2/3 VWS: CPT

## 2024-03-01 PROCEDURE — 3074F SYST BP LT 130 MM HG: CPT | Performed by: INTERNAL MEDICINE

## 2024-03-01 PROCEDURE — 3078F DIAST BP <80 MM HG: CPT | Performed by: INTERNAL MEDICINE

## 2024-03-01 RX ORDER — ATORVASTATIN CALCIUM 20 MG/1
20 TABLET, FILM COATED ORAL DAILY
Qty: 90 TABLET | Refills: 3 | Status: SHIPPED | OUTPATIENT
Start: 2024-03-01 | End: 2024-04-08 | Stop reason: SDUPTHER

## 2024-03-01 RX ORDER — AMOXICILLIN AND CLAVULANATE POTASSIUM 875; 125 MG/1; MG/1
875 TABLET, FILM COATED ORAL 2 TIMES DAILY
Qty: 20 TABLET | Refills: 0 | Status: SHIPPED | OUTPATIENT
Start: 2024-03-01 | End: 2024-03-11

## 2024-03-01 RX ORDER — AMOXICILLIN AND CLAVULANATE POTASSIUM 875; 125 MG/1; MG/1
875 TABLET, FILM COATED ORAL 2 TIMES DAILY
COMMUNITY
Start: 2022-01-13 | End: 2024-03-01 | Stop reason: SDUPTHER

## 2024-03-01 ASSESSMENT — PATIENT HEALTH QUESTIONNAIRE - PHQ9
SUM OF ALL RESPONSES TO PHQ9 QUESTIONS 1 AND 2: 0
1. LITTLE INTEREST OR PLEASURE IN DOING THINGS: NOT AT ALL
2. FEELING DOWN, DEPRESSED OR HOPELESS: NOT AT ALL

## 2024-03-01 NOTE — PROGRESS NOTES
ANNUAL CPE VISIT  Chief Complaint   Patient presents with    Annual Exam     Colonoscopy?  Repeat screens- CT scan?     HPI:Has low back pain  Right side  See taz, helps for a bit  Started  1 year ago  Throbbing crampy pain    Labs reviewed:  Component      Latest Ref Rng 9/14/2023   WBC      4.4 - 11.3 x10E9/L 5.9    RBC      4.50 - 5.90 x10E12/L 4.59    HEMOGLOBIN      13.5 - 17.5 g/dL 15.1    HEMATOCRIT      41.0 - 52.0 % 46.1    MCV      80 - 100 fL 100    MCHC      32.0 - 36.0 g/dL 32.8    Platelets      150 - 450 x10E9/L 238    RED CELL DISTRIBUTION WIDTH      11.5 - 14.5 % 11.6    Neutrophils %      40.0 - 80.0 % 56.9    Immature Granulocytes %, Automated      0.0 - 0.9 % 0.3    Lymphocytes %      13.0 - 44.0 % 27.2    Monocytes %      2.0 - 10.0 % 11.6    Eosinophils %      0.0 - 6.0 % 3.1    Basophils %      0.0 - 2.0 % 0.9    Neutrophils Absolute      1.20 - 7.70 x10E9/L 3.35    Lymphocytes Absolute      1.20 - 4.80 x10E9/L 1.60    Monocytes Absolute      0.10 - 1.00 x10E9/L 0.68    Eosinophils Absolute      0.00 - 0.70 x10E9/L 0.18    Basophils Absolute      0.00 - 0.10 x10E9/L 0.05    GLUCOSE      74 - 99 mg/dL 83    SODIUM      136 - 145 mmol/L 138    POTASSIUM      3.5 - 5.3 mmol/L 3.9    CHLORIDE      98 - 107 mmol/L 103    Bicarbonate      21 - 32 mmol/L 28    Anion Gap      10 - 20 mmol/L 11    Blood Urea Nitrogen      6 - 23 mg/dL 14    Creatinine      0.50 - 1.30 mg/dL 1.06    GFR MALE      >90 mL/min/1.73m2 84    Calcium      8.6 - 10.3 mg/dL 9.4    Albumin      3.4 - 5.0 g/dL 4.2    Alkaline Phosphatase      33 - 120 U/L 59    Total Protein      6.4 - 8.2 g/dL 6.8    AST      9 - 39 U/L 30    Bilirubin Total      0.0 - 1.2 mg/dL 0.7    ALT      10 - 52 U/L 26    Color, Urine      STRAW,YELLOW  YELLOW    Appearance, Urine      CLEAR  CLEAR    Specific Gravity, Urine      1.005 - 1.035  1.011    pH, Urine      5.0 - 8.0  6.0    Protein, Urine      NEGATIVE mg/dL NEGATIVE    Glucose, Urine       NEGATIVE mg/dL NEGATIVE    Blood, Urine      NEGATIVE  NEGATIVE    Ketones, Urine      NEGATIVE mg/dL NEGATIVE    Bilirubin, Urine      NEGATIVE  NEGATIVE    Urobilinogen, Urine      0.0 - 1.9 mg/dL <2.0    Nitrite, Urine      NEGATIVE  NEGATIVE    Leukocyte Esterase, Urine      NEGATIVE  NEGATIVE    CHOLESTEROL      0 - 199 mg/dL 124    HDL CHOLESTEROL      mg/dL 39.2 !    Cholesterol/HDL Ratio 3.2    LDL Calculated      0 - 99 mg/dL 60    VLDL      0 - 40 mg/dL 25    TRIGLYCERIDES      0 - 149 mg/dL 125    Thyroid Stimulating Hormone      0.44 - 3.98 mIU/L 4.57 (H)    Vit D, 1,25-Dihydroxy      19.9 - 79.3 pg/mL 41.0    Thyroxine, Free      0.61 - 1.12 ng/dL 0.95       Repeat TSH 3.8   2/27/24    Assessment and Plan:  Problem List Items Addressed This Visit          High    Routine health maintenance - Primary    Overview     Flu vaccine 11/1/20, 11/18/22   TD Adult12/23/2004   Tdap (Age 7+)11/6/2012, 2/16/23  Moderna: 3/20/21, 4/17/21, Pfizer 12/30/21  Cscope 10/15/19 (5yrs)  1/18/21 PSA 0.2; 2/15/23 0.27  Declines shingrix vaccines         Current Assessment & Plan     Annual Wellness exam completed   Preventive Health History reviewed  Ordered:   Labs    Cscope              Relevant Orders    Comprehensive Metabolic Panel    CBC and Auto Differential    Lipid Panel    Urinalysis with Reflex Culture and Microscopic       Medium    Abnormal screening cardiac CT    Overview     CT calcium score: 10/2018- 113.78/89%   On statin, ASA  Goal LDL <70           Diverticulosis    Overview     Takes augmentin RX with him when he travels          Relevant Medications    amoxicillin-pot clavulanate (Augmentin) 875-125 mg tablet    Exercise-induced asthma    Overview     Prn albuterol  On singulair         Hyperlipidemia, mixed    Overview     10/4/18: CT calcium score: 113.78   Goal LDL <100   On statin and fish oil         Current Assessment & Plan     Will get repeat CT calcium s ore as has been > 5 years         Relevant  "Medications    atorvastatin (Lipitor) 20 mg tablet    Other Relevant Orders    TSH with reflex to Free T4 if abnormal    Hypertension, essential    Overview     <130/80 On ARb/diuretic in past On BB (for PVCs)  3/23: Our machine 130/84 P 57  His machine  132/87 P57  Controlled on BB; monitor          Relevant Orders    Albumin , Urine Random    Screening for colon cancer    Relevant Orders    Colonoscopy Screening; Average Risk Patient    Screening for prostate cancer    Relevant Orders    Prostate Specific Antigen, Screen    Vitamin D deficiency    Relevant Orders    Vitamin D 25-Hydroxy,Total (for eval of Vitamin D levels)     Other Visit Diagnoses       Screening for cardiovascular condition        Relevant Orders    CT cardiac scoring wo IV contrast    Right lumbar pain        XRAYS ordered  PT with MR    Relevant Orders    XR lumbar spine 2-3 views              ROS otherwise negative aside from what was mentioned above in HPI.    Vitals  /72   Pulse 71   Temp 36.4 °C (97.6 °F)   Ht 1.842 m (6' 0.5\")   Wt 96.2 kg (212 lb)   SpO2 99%   BMI 28.36 kg/m²   Body mass index is 28.36 kg/m².  Physical Exam  Gen: Alert, NAD  HEENT:  Normal exam  Neck:  No masses/nodes palpable; Thyroid nontender and without nodules; No POLO  Respiratory:  Lungs CTAB  CV: RRR  Neuro:  Gross motor and sensory intact  Back: tender over the right piriformis and also RLE longer than left  Skin:  No suspicious lesions present  Breast: No masses or axillary lymphadenopathy  TRACY: Prostate not enlarged. No prostate mass or nodule(s) palpated, brown stool. (Pt declined chaperone)    Allergies and Medications  Lisinopril  Current Outpatient Medications   Medication Instructions    albuterol 90 mcg/actuation inhaler EVERY 4- 6 HOURS AS NEEDED FOR WHEEZING/SHORTNESS OF BREATh    amoxicillin-pot clavulanate (Augmentin) 875-125 mg tablet 875 mg, oral, 2 times daily    atorvastatin (LIPITOR) 20 mg, oral, Daily    cetirizine (ZYRTEC) 10 mg " capsule 1 tablet, oral, Daily    cholecalciferol (Vitamin D-3) 25 MCG (1000 UT) capsule 2 tablets, oral, Daily    cyanocobalamin, vitamin B-12, (Vitamin B-12) 1,000 mcg tablet extended release 1 tablet, oral, Daily, As directed    docosahexaenoic acid/epa (FISH OIL ORAL) 1,000 mg, oral, Daily PRN, 1 Capsule daily    fluticasone (Flonase) 50 mcg/actuation nasal spray 1 spray, nasal, Daily    montelukast (SINGULAIR) 10 mg, oral, Daily, As directed    nebivolol (BYSTOLIC) 5 mg, oral, Daily    omeprazole (PRILOSEC) 20 mg, oral, Daily    sildenafil (Viagra) 25 mg tablet 1 tablet, oral, Daily PRN, 1 hour before needed       Active Problem List  Patient Active Problem List   Diagnosis    Allergic rhinitis    Chronic sinusitis    Exercise-induced asthma    Grade I hemorrhoids    Hyperlipidemia, mixed    Hypertension, essential    IFG (impaired fasting glucose)    Macrocytosis    Mild obstructive sleep apnea    PSVT (paroxysmal supraventricular tachycardia)    Vitamin D deficiency    Abnormal screening cardiac CT    Erectile dysfunction    PVC (premature ventricular contraction)    Routine health maintenance    Cardiac risk counseling    Diverticulosis    GERD without esophagitis    BMI 28.0-28.9,adult    Screening for prostate cancer    Screening for colon cancer       Comprehensive Medical/Surgical/Social/Family History  Past Medical History:   Diagnosis Date    Abnormal screening cardiac CT 10/04/2018    CT calcium score: 113.78    H/O magnetic resonance imaging     3/23: Unremarkable MRI of the pancreas. No evidence of pancreatic ductal dilatation or masses.    Personal history of other medical treatment     H/O echocardiogram    Personal history of other medical treatment     H/O chest x-ray    Personal history of other medical treatment     History of diagnostic ultrasound    Personal history of other medical treatment     History of CT scan of chest     Past Surgical History:   Procedure Laterality Date    COLONOSCOPY   10/15/2019    10/19: Impression:         - One small polyp in the cecum, removed with a cold                     snare. Resected and retrieved.                     - Moderate diverticulosis in the sigmoid colon and in the                     descending colon.                     - Small non-bleeding external and internal hemorrhoids.5y    HERNIA REPAIR  2020    laparoscopic. 2020 [place] Dr. Quan Smith: lap repair of incarcerated L inguinal hernia with mesh, SUSANA approach    NASAL ENDOSCOPY  2018    Polypectomy. rigid endoscopy ENT, TURBINATES:  bilateral, Moderate hypertrophy    OTHER SURGICAL HISTORY  2018    Spirometry.  (-)    POLYSOMNOGRAPHY      2018: mild stephania    SEPTOPLASTY  2018    Septoplasty    SKIN BIOPSY  2018    Precancerous skin lesion. atypical junctional melanocytic proliferation with predominant pagetoid features. An evolving melanoma in-situ is considered' reexcision- central acutely inflamed horn, acanthosis, entirely reactive and no melanocytic dysplasia    VASECTOMY  2018    Surgery Vas Deferens Vasectomy       Social History     Social History Narrative    Fa,dexter History:    M: Alopecia, Anxiety, Dementia age 64 (Laura) ( age 72)    F: HTN, hyperlipidemia, CAD/stents age 60,CABG x 2, Gout, CRI/CKD Bile Duct/Pancreatic CA ( 78)    PGF: CAD ( age 60 MI)     PGM: Pancreatic CA    S:    sp Genetic consult : Based on his father's history of exocrine pancreatic cancer, Mr. OQUENDO meets NCCN criteria for testing of pancreatic cancer susceptibility genes, including BRCA1 and BRCA2.     At the conclusion of our discussion today, Mr. OQUENDO opted not to proceed with genetic testing at this time in order to look into his luxury insurance coverage.    ___     (Kayleen), 2 kids    [employer]    Nonsmoker    Social ETOH

## 2024-03-02 PROBLEM — M47.816 LUMBAR SPONDYLOSIS: Status: ACTIVE | Noted: 2024-03-02

## 2024-04-04 ENCOUNTER — HOSPITAL ENCOUNTER (OUTPATIENT)
Dept: RADIOLOGY | Facility: CLINIC | Age: 54
Discharge: HOME | End: 2024-04-04
Payer: COMMERCIAL

## 2024-04-04 DIAGNOSIS — Z13.6 SCREENING FOR CARDIOVASCULAR CONDITION: ICD-10-CM

## 2024-04-04 PROCEDURE — 75571 CT HRT W/O DYE W/CA TEST: CPT

## 2024-04-05 PROBLEM — K44.9 HIATAL HERNIA: Status: ACTIVE | Noted: 2024-04-05

## 2024-04-05 PROBLEM — I77.819 AORTIC ECTASIA (CMS-HCC): Status: ACTIVE | Noted: 2024-04-05

## 2024-04-05 PROBLEM — I28.8 DILATION OF PULMONARY ARTERY (MULTI): Status: ACTIVE | Noted: 2024-04-05

## 2024-04-08 ENCOUNTER — TELEMEDICINE (OUTPATIENT)
Dept: PRIMARY CARE | Facility: CLINIC | Age: 54
End: 2024-04-08
Payer: COMMERCIAL

## 2024-04-08 VITALS
BODY MASS INDEX: 27.17 KG/M2 | HEART RATE: 61 BPM | HEIGHT: 73 IN | SYSTOLIC BLOOD PRESSURE: 120 MMHG | WEIGHT: 205 LBS | DIASTOLIC BLOOD PRESSURE: 80 MMHG

## 2024-04-08 DIAGNOSIS — I28.8 DILATION OF PULMONARY ARTERY (MULTI): ICD-10-CM

## 2024-04-08 DIAGNOSIS — E78.2 HYPERLIPIDEMIA, MIXED: ICD-10-CM

## 2024-04-08 DIAGNOSIS — R07.89 CHEST TIGHTNESS: ICD-10-CM

## 2024-04-08 DIAGNOSIS — R93.1 ABNORMAL SCREENING CARDIAC CT: Primary | ICD-10-CM

## 2024-04-08 DIAGNOSIS — I77.819 AORTIC ECTASIA (CMS-HCC): ICD-10-CM

## 2024-04-08 PROBLEM — R06.2 WHEEZING ON EXPIRATION: Status: ACTIVE | Noted: 2024-04-08

## 2024-04-08 PROBLEM — I25.84 CALCIFICATION OF CORONARY ARTERY: Status: RESOLVED | Noted: 2024-04-08 | Resolved: 2024-04-08

## 2024-04-08 PROBLEM — I25.10 CALCIFICATION OF CORONARY ARTERY: Status: RESOLVED | Noted: 2024-04-08 | Resolved: 2024-04-08

## 2024-04-08 PROBLEM — H10.30 ACUTE INFECTIVE CONJUNCTIVITIS: Status: ACTIVE | Noted: 2024-04-08

## 2024-04-08 PROBLEM — I25.84 CALCIFICATION OF CORONARY ARTERY: Status: ACTIVE | Noted: 2024-04-08

## 2024-04-08 PROBLEM — M54.50 LOW BACK PAIN: Status: ACTIVE | Noted: 2024-04-08

## 2024-04-08 PROBLEM — D12.6 ADENOMATOUS POLYP OF COLON: Status: ACTIVE | Noted: 2024-04-08

## 2024-04-08 PROBLEM — R06.83 SNORING: Status: ACTIVE | Noted: 2024-04-08

## 2024-04-08 PROBLEM — K40.90 LEFT INGUINAL HERNIA: Status: ACTIVE | Noted: 2024-04-05

## 2024-04-08 PROBLEM — M77.10 LATERAL EPICONDYLITIS: Status: ACTIVE | Noted: 2022-07-12

## 2024-04-08 PROBLEM — Z86.0100 HISTORY OF COLONIC POLYPS: Status: ACTIVE | Noted: 2024-04-08

## 2024-04-08 PROBLEM — R00.2 PALPITATIONS: Status: ACTIVE | Noted: 2018-01-08

## 2024-04-08 PROBLEM — N28.9 LOW KIDNEY FUNCTION: Status: ACTIVE | Noted: 2023-02-21

## 2024-04-08 PROBLEM — J45.990 EXERCISE INDUCED BRONCHOSPASM (HHS-HCC): Status: ACTIVE | Noted: 2018-01-08

## 2024-04-08 PROBLEM — H10.30 ACUTE INFECTIVE CONJUNCTIVITIS: Status: RESOLVED | Noted: 2024-04-08 | Resolved: 2024-04-08

## 2024-04-08 PROBLEM — J30.89 SEASONAL AND PERENNIAL ALLERGIC RHINITIS: Status: ACTIVE | Noted: 2024-04-08

## 2024-04-08 PROBLEM — K57.30 DIVERTICULOSIS OF COLON: Status: ACTIVE | Noted: 2023-05-18

## 2024-04-08 PROBLEM — Z86.010 HISTORY OF COLONIC POLYPS: Status: ACTIVE | Noted: 2024-04-08

## 2024-04-08 PROBLEM — I25.10 CALCIFICATION OF CORONARY ARTERY: Status: ACTIVE | Noted: 2024-04-08

## 2024-04-08 PROBLEM — J30.2 SEASONAL AND PERENNIAL ALLERGIC RHINITIS: Status: ACTIVE | Noted: 2024-04-08

## 2024-04-08 PROCEDURE — 99214 OFFICE O/P EST MOD 30 MIN: CPT | Performed by: INTERNAL MEDICINE

## 2024-04-08 PROCEDURE — 3079F DIAST BP 80-89 MM HG: CPT | Performed by: INTERNAL MEDICINE

## 2024-04-08 PROCEDURE — 3074F SYST BP LT 130 MM HG: CPT | Performed by: INTERNAL MEDICINE

## 2024-04-08 PROCEDURE — 1036F TOBACCO NON-USER: CPT | Performed by: INTERNAL MEDICINE

## 2024-04-08 RX ORDER — ATORVASTATIN CALCIUM 40 MG/1
40 TABLET, FILM COATED ORAL DAILY
Qty: 90 TABLET | Refills: 3 | Status: SHIPPED | OUTPATIENT
Start: 2024-04-08 | End: 2025-04-08

## 2024-04-08 ASSESSMENT — PATIENT HEALTH QUESTIONNAIRE - PHQ9
1. LITTLE INTEREST OR PLEASURE IN DOING THINGS: NOT AT ALL
SUM OF ALL RESPONSES TO PHQ9 QUESTIONS 1 AND 2: 0
2. FEELING DOWN, DEPRESSED OR HOPELESS: NOT AT ALL

## 2024-04-08 NOTE — PROGRESS NOTES
An interactive telecommunication system which permits real time communications between the patient (at the originating site) and provider (at the distant site) was utilized to provide this telehealth service.    Verbal consent was requested and obtained from the patient for this telehealth visit.    We have confirmed the patient wishes to see me, Dr. Stephanie Oconnor, a board certified Internal Medicine physician with an active [place] medical license as well as verified the patient's identity and physical location in [place].  Audio and Visual both.    CC/HPI:   Follow-up (Discuss CT cardiac score results/Feels well no symptoms/Taking meds as prescribed).  Sees cardiology at [facility]   4/24:CT calcium score reviewed:   1. Elevated coronary artery calcium score of 405.46*.  2. This patient is at risk for future cardiovascular events. If this  patient is currently not seeing a cardiologist, consider referring to  the [place] HVI Prevention Program or if patient is diabetic  please consider referring to the [facility] comprehensive cardiovascular  disease management program by emailing [email or web address].  3. Ascending aortic ectasia measuring 3.9 cm on axial images at the  level of the main pulmonary artery. Recommend follow-up radiation  sparing cardiac MRI and thoracic MRA to assess status of the aortic  valve and size and extent of the aneurysm in approximately 36-48  months.  4. Central main pulmonary artery appears dilated correlate with  elevated right-sided filling pressures  5. Small hiatal hernia.        CT calcium score in 2018 = 113.78. In 2024 = 405.6   Started on low dose statin then  LDL 2019 was 112  Doubled to 20mg in 11/2020    ECHO 3/22: - The left ventricle is normal in size. Left ventricular systolic function is   normal. EF = 55 ± 5% (visual est.)   - The right ventricle is normal in size. Right ventricular systolic function is   normal.   - Exam was compared with the prior  echocardiographic exam  performed on   12/30/2016 (Stress). Comparable LV EF.   AORTA   The visualized aorta is normal in size.   Measurements - Mid ascending aorta 3.4 cm.       BP at home has been good  On BB  Controls stress  Does note that he  tightness in shoulder chest areas, back  Goes away after sees chiropractor usually    PE:  Gen: Alert, NAD      Allergies and Medications  Lisinopril  Current Outpatient Medications   Medication Instructions    albuterol 90 mcg/actuation inhaler EVERY 4- 6 HOURS AS NEEDED FOR WHEEZING/SHORTNESS OF BREATh    atorvastatin (LIPITOR) 40 mg, oral, Daily    cetirizine (ZYRTEC) 10 mg capsule 1 tablet, oral, Daily    cholecalciferol (Vitamin D-3) 25 MCG (1000 UT) capsule 2 tablets, oral, Daily    cyanocobalamin, vitamin B-12, (Vitamin B-12) 1,000 mcg tablet extended release 1 tablet, oral, Daily, As directed    docosahexaenoic acid/epa (FISH OIL ORAL) 1,000 mg, oral, Daily PRN, 1 Capsule daily    fluticasone (Flonase) 50 mcg/actuation nasal spray 1 spray, nasal, Daily    montelukast (SINGULAIR) 10 mg, oral, Daily, As directed    nebivolol (BYSTOLIC) 5 mg, oral, Daily    omeprazole (PRILOSEC) 20 mg, oral, Daily    sildenafil (Viagra) 25 mg tablet 1 tablet, oral, Daily PRN, 1 hour before needed     Problem List Items Addressed This Visit          Medium    Abnormal screening cardiac CT - Primary    Overview     CT calcium score: 10/2018- 113.78/89%   On statin, ASA  Goal LDL <70  4/2024: LM 0,  .85,  LCx 0,  .61,  Total 405.46           Relevant Orders    Nuclear Stress Test    CT angio chest w and wo IV contrast    Lipoprotein A (LPA)    Apolipoprotein B    Aortic ectasia (CMS/HCC)    Overview     4/2024 CT cardiac scan finding: Ascending aortic ectasia measuring3.9 cm on axial images at the  level of the main pulmonary artery. Recommend follow-up radiation sparing cardiac MRI and thoracic MRA to assess status of the aortic valve and size and extent of the aneurysm in approximately 36-48  months.  Per UTD: 3.5 to 4.4 cm: Annual CT or MR angiography, echocardiogram to follow valvular disease (if needed)   Goal BP 120s/70s  On BB         Current Assessment & Plan     Will plan to get CTA in 1 year         Relevant Orders    CT angio chest w and wo IV contrast    Dilation of pulmonary artery (CMS/HCC)    Overview     4/2024 CT cardiac scan finding: Central main pulmonary artery appears dilated correlate with  elevated right-sided filling pressures         Current Assessment & Plan     Will watch that when we do f/up scan in 1 year         Hyperlipidemia, mixed    Overview     10/4/18: CT calcium score: 113.78   Goal LDL <100   On statin and fish oil         Current Assessment & Plan     Increase statin to 40mg  Labs in 6 weeks         Relevant Medications    atorvastatin (Lipitor) 40 mg tablet    Other Relevant Orders    Lipid panel    ALT    CK    Lipoprotein A (LPA)    Apolipoprotein B     Other Visit Diagnoses       Chest tightness        Will get stress test  increase statin to 40mg  Labs in 6 weeks    Relevant Orders    Nuclear Stress Test

## 2024-04-09 PROBLEM — D12.6 ADENOMATOUS POLYP OF COLON: Status: RESOLVED | Noted: 2024-04-08 | Resolved: 2024-04-09

## 2024-04-09 PROBLEM — J30.2 SEASONAL AND PERENNIAL ALLERGIC RHINITIS: Status: RESOLVED | Noted: 2024-04-08 | Resolved: 2024-04-09

## 2024-04-09 PROBLEM — R06.2 WHEEZING ON EXPIRATION: Status: RESOLVED | Noted: 2024-04-08 | Resolved: 2024-04-09

## 2024-04-09 PROBLEM — N28.9 LOW KIDNEY FUNCTION: Status: RESOLVED | Noted: 2023-02-21 | Resolved: 2024-04-09

## 2024-04-09 PROBLEM — M54.50 LOW BACK PAIN: Status: RESOLVED | Noted: 2024-04-08 | Resolved: 2024-04-09

## 2024-04-09 PROBLEM — K57.30 DIVERTICULOSIS OF COLON: Status: RESOLVED | Noted: 2023-05-18 | Resolved: 2024-04-09

## 2024-04-09 PROBLEM — J30.89 SEASONAL AND PERENNIAL ALLERGIC RHINITIS: Status: RESOLVED | Noted: 2024-04-08 | Resolved: 2024-04-09

## 2024-04-09 PROBLEM — M77.10 LATERAL EPICONDYLITIS: Status: RESOLVED | Noted: 2022-07-12 | Resolved: 2024-04-09

## 2024-04-23 DIAGNOSIS — N52.9 MALE ERECTILE DYSFUNCTION, UNSPECIFIED: ICD-10-CM

## 2024-04-24 ENCOUNTER — HOSPITAL ENCOUNTER (OUTPATIENT)
Dept: RADIOLOGY | Facility: HOSPITAL | Age: 54
Discharge: HOME | End: 2024-04-24
Payer: COMMERCIAL

## 2024-04-24 DIAGNOSIS — J45.990 EXERCISE-INDUCED ASTHMA (HHS-HCC): ICD-10-CM

## 2024-04-24 DIAGNOSIS — R93.1 ABNORMAL SCREENING CARDIAC CT: ICD-10-CM

## 2024-04-24 DIAGNOSIS — I77.819 AORTIC ECTASIA (CMS-HCC): ICD-10-CM

## 2024-04-24 PROCEDURE — 2550000001 HC RX 255 CONTRASTS: Performed by: INTERNAL MEDICINE

## 2024-04-24 PROCEDURE — 71275 CT ANGIOGRAPHY CHEST: CPT

## 2024-04-24 PROCEDURE — 71275 CT ANGIOGRAPHY CHEST: CPT | Performed by: INTERNAL MEDICINE

## 2024-04-24 RX ORDER — ALBUTEROL SULFATE 90 UG/1
AEROSOL, METERED RESPIRATORY (INHALATION)
Qty: 18 G | Refills: 3 | Status: SHIPPED | OUTPATIENT
Start: 2024-04-24 | End: 2024-04-25 | Stop reason: SDUPTHER

## 2024-04-24 RX ORDER — SILDENAFIL 25 MG/1
TABLET, FILM COATED ORAL
Qty: 30 TABLET | Refills: 1 | Status: SHIPPED | OUTPATIENT
Start: 2024-04-24

## 2024-04-24 RX ADMIN — IOHEXOL 90 ML: 350 INJECTION, SOLUTION INTRAVENOUS at 08:05

## 2024-04-24 NOTE — TELEPHONE ENCOUNTER
Received fax from [facility] requesting refill of albuterol hfa inh 200 puffs 8.5gm

## 2024-04-25 RX ORDER — ALBUTEROL SULFATE 90 UG/1
AEROSOL, METERED RESPIRATORY (INHALATION)
Qty: 18 G | Refills: 3 | Status: SHIPPED | OUTPATIENT
Start: 2024-04-25

## 2024-04-25 NOTE — TELEPHONE ENCOUNTER
Andressa calling about albuterol rx  Needs to know how many puffs on the rx  Formatted 1-2 puffs q4-6h prn for sob and wheezing

## 2024-04-26 ENCOUNTER — HOSPITAL ENCOUNTER (OUTPATIENT)
Dept: RADIOLOGY | Facility: HOSPITAL | Age: 54
Discharge: HOME | End: 2024-04-26
Payer: COMMERCIAL

## 2024-04-26 ENCOUNTER — HOSPITAL ENCOUNTER (OUTPATIENT)
Dept: CARDIOLOGY | Facility: HOSPITAL | Age: 54
Discharge: HOME | End: 2024-04-26
Payer: COMMERCIAL

## 2024-04-26 DIAGNOSIS — R07.89 CHEST TIGHTNESS: ICD-10-CM

## 2024-04-26 DIAGNOSIS — R93.1 ABNORMAL SCREENING CARDIAC CT: ICD-10-CM

## 2024-04-26 PROCEDURE — 3430000001 HC RX 343 DIAGNOSTIC RADIOPHARMACEUTICALS: Performed by: INTERNAL MEDICINE

## 2024-04-26 PROCEDURE — 78452 HT MUSCLE IMAGE SPECT MULT: CPT

## 2024-04-26 PROCEDURE — 78452 HT MUSCLE IMAGE SPECT MULT: CPT | Performed by: STUDENT IN AN ORGANIZED HEALTH CARE EDUCATION/TRAINING PROGRAM

## 2024-04-26 PROCEDURE — A9502 TC99M TETROFOSMIN: HCPCS | Performed by: INTERNAL MEDICINE

## 2024-04-26 PROCEDURE — 93017 CV STRESS TEST TRACING ONLY: CPT

## 2024-04-26 RX ADMIN — TETROFOSMIN 33.2 MILLICURIE: 0.23 INJECTION, POWDER, LYOPHILIZED, FOR SOLUTION INTRAVENOUS at 10:00

## 2024-04-26 RX ADMIN — TETROFOSMIN 10.2 MILLICURIE: 0.23 INJECTION, POWDER, LYOPHILIZED, FOR SOLUTION INTRAVENOUS at 08:15

## 2024-05-02 ENCOUNTER — TELEPHONE (OUTPATIENT)
Dept: PRIMARY CARE | Facility: CLINIC | Age: 54
End: 2024-05-02
Payer: COMMERCIAL

## 2024-05-02 NOTE — TELEPHONE ENCOUNTER
Spoke with patient.  Relayed that the test was approved by Matt.  Patient verbally understood.    Scanned and placed into patients chart.

## 2024-05-07 ENCOUNTER — ANESTHESIA EVENT (OUTPATIENT)
Dept: GASTROENTEROLOGY | Facility: EXTERNAL LOCATION | Age: 54
End: 2024-05-07

## 2024-05-08 ENCOUNTER — LAB (OUTPATIENT)
Dept: LAB | Facility: LAB | Age: 54
End: 2024-05-08
Payer: COMMERCIAL

## 2024-05-08 DIAGNOSIS — E78.2 HYPERLIPIDEMIA, MIXED: ICD-10-CM

## 2024-05-08 DIAGNOSIS — R93.1 ABNORMAL SCREENING CARDIAC CT: ICD-10-CM

## 2024-05-08 LAB
ALT SERPL W P-5'-P-CCNC: 19 U/L (ref 10–52)
CHOLEST SERPL-MCNC: 115 MG/DL (ref 0–199)
CHOLESTEROL/HDL RATIO: 3
CK SERPL-CCNC: 76 U/L (ref 0–325)
HDLC SERPL-MCNC: 38 MG/DL
LDLC SERPL CALC-MCNC: 49 MG/DL
NON HDL CHOLESTEROL: 77 MG/DL (ref 0–149)
TRIGL SERPL-MCNC: 141 MG/DL (ref 0–149)
VLDL: 28 MG/DL (ref 0–40)

## 2024-05-08 PROCEDURE — 82172 ASSAY OF APOLIPOPROTEIN: CPT

## 2024-05-08 PROCEDURE — 36415 COLL VENOUS BLD VENIPUNCTURE: CPT

## 2024-05-08 PROCEDURE — 84460 ALANINE AMINO (ALT) (SGPT): CPT

## 2024-05-08 PROCEDURE — 83695 ASSAY OF LIPOPROTEIN(A): CPT

## 2024-05-08 PROCEDURE — 80061 LIPID PANEL: CPT

## 2024-05-08 PROCEDURE — 82550 ASSAY OF CK (CPK): CPT

## 2024-05-09 LAB
APO B100 SERPL-MCNC: 64 MG/DL (ref 66–133)
LPA SERPL-MCNC: 43 MG/DL

## 2024-05-10 PROBLEM — J45.990 EXERCISE INDUCED BRONCHOSPASM (HHS-HCC): Status: RESOLVED | Noted: 2018-01-08 | Resolved: 2024-05-10

## 2024-05-14 ENCOUNTER — ANESTHESIA (OUTPATIENT)
Dept: GASTROENTEROLOGY | Facility: EXTERNAL LOCATION | Age: 54
End: 2024-05-14

## 2024-05-14 ENCOUNTER — HOSPITAL ENCOUNTER (OUTPATIENT)
Dept: GASTROENTEROLOGY | Facility: EXTERNAL LOCATION | Age: 54
Discharge: HOME | End: 2024-05-14
Payer: COMMERCIAL

## 2024-05-14 VITALS
BODY MASS INDEX: 27.17 KG/M2 | HEIGHT: 73 IN | SYSTOLIC BLOOD PRESSURE: 121 MMHG | WEIGHT: 205 LBS | RESPIRATION RATE: 16 BRPM | HEART RATE: 51 BPM | OXYGEN SATURATION: 96 % | TEMPERATURE: 98.1 F | DIASTOLIC BLOOD PRESSURE: 83 MMHG

## 2024-05-14 DIAGNOSIS — Z12.11 SCREENING FOR COLON CANCER: ICD-10-CM

## 2024-05-14 PROCEDURE — 88305 TISSUE EXAM BY PATHOLOGIST: CPT | Performed by: PATHOLOGY

## 2024-05-14 PROCEDURE — 45385 COLONOSCOPY W/LESION REMOVAL: CPT | Performed by: STUDENT IN AN ORGANIZED HEALTH CARE EDUCATION/TRAINING PROGRAM

## 2024-05-14 PROCEDURE — 88305 TISSUE EXAM BY PATHOLOGIST: CPT | Mod: TC,ELYLAB | Performed by: STUDENT IN AN ORGANIZED HEALTH CARE EDUCATION/TRAINING PROGRAM

## 2024-05-14 RX ORDER — LIDOCAINE HYDROCHLORIDE 20 MG/ML
INJECTION, SOLUTION INFILTRATION; PERINEURAL AS NEEDED
Status: DISCONTINUED | OUTPATIENT
Start: 2024-05-14 | End: 2024-05-14

## 2024-05-14 RX ORDER — SODIUM CHLORIDE 9 MG/ML
20 INJECTION, SOLUTION INTRAVENOUS CONTINUOUS
Status: DISCONTINUED | OUTPATIENT
Start: 2024-05-14 | End: 2024-05-15 | Stop reason: HOSPADM

## 2024-05-14 RX ORDER — PROPOFOL 10 MG/ML
INJECTION, EMULSION INTRAVENOUS AS NEEDED
Status: DISCONTINUED | OUTPATIENT
Start: 2024-05-14 | End: 2024-05-14

## 2024-05-14 RX ADMIN — PROPOFOL 100 MG: 10 INJECTION, EMULSION INTRAVENOUS at 07:30

## 2024-05-14 RX ADMIN — PROPOFOL 30 MG: 10 INJECTION, EMULSION INTRAVENOUS at 07:43

## 2024-05-14 RX ADMIN — PROPOFOL 50 MG: 10 INJECTION, EMULSION INTRAVENOUS at 07:45

## 2024-05-14 RX ADMIN — PROPOFOL 50 MG: 10 INJECTION, EMULSION INTRAVENOUS at 07:33

## 2024-05-14 RX ADMIN — PROPOFOL 20 MG: 10 INJECTION, EMULSION INTRAVENOUS at 07:38

## 2024-05-14 RX ADMIN — SODIUM CHLORIDE: 9 INJECTION, SOLUTION INTRAVENOUS at 07:09

## 2024-05-14 RX ADMIN — LIDOCAINE HYDROCHLORIDE 40 MG: 20 INJECTION, SOLUTION INFILTRATION; PERINEURAL at 07:30

## 2024-05-14 SDOH — HEALTH STABILITY: MENTAL HEALTH: CURRENT SMOKER: 0

## 2024-05-14 ASSESSMENT — PAIN - FUNCTIONAL ASSESSMENT
PAIN_FUNCTIONAL_ASSESSMENT: 0-10

## 2024-05-14 ASSESSMENT — COLUMBIA-SUICIDE SEVERITY RATING SCALE - C-SSRS
1. IN THE PAST MONTH, HAVE YOU WISHED YOU WERE DEAD OR WISHED YOU COULD GO TO SLEEP AND NOT WAKE UP?: NO
6. HAVE YOU EVER DONE ANYTHING, STARTED TO DO ANYTHING, OR PREPARED TO DO ANYTHING TO END YOUR LIFE?: NO
2. HAVE YOU ACTUALLY HAD ANY THOUGHTS OF KILLING YOURSELF?: NO

## 2024-05-14 ASSESSMENT — PAIN SCALES - GENERAL
PAIN_LEVEL: 0
PAINLEVEL_OUTOF10: 0 - NO PAIN

## 2024-05-14 NOTE — ANESTHESIA POSTPROCEDURE EVALUATION
Patient: Tha Short    Procedure Summary       Date: 05/14/24 Room / Location: [facility]    Anesthesia Start: 0729 Anesthesia Stop:     Procedure: COLONOSCOPY Diagnosis: Screening for colon cancer    Scheduled Providers: Eder Solis DO; ALFONSO Hong Responsible Provider: ALFONSO Hong    Anesthesia Type: MAC ASA Status: 3            Anesthesia Type: MAC    Vitals Value Taken Time   /62 05/14/24 0757   Temp 36.8 05/14/24 0757   Pulse 54 05/14/24 0757   Resp 14 05/14/24 0757   SpO2 96 05/14/24 0757       Anesthesia Post Evaluation    Patient location during evaluation: bedside  Patient participation: complete - patient participated  Level of consciousness: awake  Pain score: 0  Pain management: adequate  Airway patency: patent  Cardiovascular status: acceptable  Respiratory status: acceptable and room air  Hydration status: acceptable  Postoperative Nausea and Vomiting: none      No notable events documented.

## 2024-05-14 NOTE — ANESTHESIA PREPROCEDURE EVALUATION
Patient: Tah Short    Procedure Information       Date/Time: 05/14/24 0730    Scheduled providers: Eder Solis DO; BRIANNA Hong-CRNA    Procedure: COLONOSCOPY    Location: [facility]            Relevant Problems   Cardiac   (+) Hyperlipidemia, mixed   (+) Hypertension, essential   (+) PSVT (paroxysmal supraventricular tachycardia) (CMS-HCC)   (+) PVC (premature ventricular contraction)      Pulmonary   (+) Exercise-induced asthma (HHS-HCC)   (+) Mild obstructive sleep apnea      GI   (+) GERD without esophagitis   (+) Hiatal hernia      Musculoskeletal   (+) Lumbar spondylosis      HEENT   (+) Chronic sinusitis      Circulatory   (+) Aortic ectasia (CMS-HCC)   (+) Dilation of pulmonary artery (Multi)   (+) Palpitations       Clinical information reviewed:   Tobacco  Allergies  Meds  Problems  Med Hx  Surg Hx   Fam Hx  Soc   Hx        NPO Detail:  No data recorded     Physical Exam    Airway  Mallampati: IV  TM distance: >3 FB  Neck ROM: full     Cardiovascular - normal exam     Dental - normal exam     Pulmonary - normal exam     Abdominal   (+) obese         Anesthesia Plan    History of general anesthesia?: yes  History of complications of general anesthesia?: no    ASA 3     MAC   (Preoxygenated 2L prior to procedure.  Patient positioned self to comfort prior to sedation administered; eyes closed; continuous monitoring)  The patient is not a current smoker.    intravenous induction   Anesthetic plan and risks discussed with patient.    Plan discussed with CRNA.

## 2024-05-14 NOTE — H&P
Outpatient NR Procedure H&P    Patient Profile  Name Tha Short  Date of Birth 1970  MRN [id number]  PCP Stephanie Oconnor        Diagnosis: History of polyps.  Procedure(s):  Colonoscopy.    Allergies  Allergies   Allergen Reactions    Lisinopril Other     Dizziness       Past Medical History   Past Medical History:   Diagnosis Date    Abnormal screening cardiac CT     CT calcium score in 2018 = 113.78. In 2024 = 405.6    H/O cardiovascular stress test 01/01/2016    - The exercise stress echo was negative for ischemia at 95 % of MPHR (13.3 METS).  Good functional capacity for age and gender.  - The left ventricle is normal in size. Left ventricular systolic function is  normal. EF = 63 ± 5% (2D biplane) Baseline left ventricular diastolic function is  normal.  - The right ventricle is normal in size. Right ventricular systolic function is  normal.    H/O cardiovascular stress test 04/26/2024    1. Negative myocardial perfusion study without evidence of inducible myocardial ischemia or prior infarction. 2. Mild dilatation of left ventricle 3. Normal LV wall motion with a post-stress LV EF estimated at 52%.    H/O CT scan of chest 04/26/2024    CTA chest: 1. Upper normal aortic root dimension (3.7 cm). The ascending thoracic aorta, arch and descending thoracic aorta are normal caliber. 2. There is no evidence for acute aortic pathology.    H/O echocardiogram 03/16/2022    - The left ventricle is normal in size. Left ventricular systolic function is  normal. EF = 55 ± 5% (visual est.)  - The right ventricle is normal in size. Right ventricular systolic function is  normal.  - Exam was compared with the prior  echocardiographic exam performed on  12/30/2016 (Stress). Comparable LV EF.    H/O magnetic resonance imaging     3/23: Unremarkable MRI of the pancreas. No evidence of pancreatic ductal dilatation or masses.    H/O x-ray of lumbar spine 03/02/2024    There is mild facet disease at L4-5 and L5-S1. There is  mild spondylotic changes. There is no disc space narrowing. No fracture or spondylolisthesis    Hyperlipidemia     Hypertension     Seasonal allergies        Medication Reviewed - yes  Prior to Admission medications    Medication Sig Start Date End Date Taking? Authorizing Provider   albuterol 90 mcg/actuation inhaler 1-2 puffs EVERY 4- 6 HOURS AS NEEDED FOR WHEEZING/SHORTNESS OF BREATH 4/25/24   [facility]   atorvastatin (Lipitor) 40 mg tablet Take 1 tablet (40 mg) by mouth once daily. 4/8/24 4/8/25  Stephanie Oconnor MD   cetirizine (ZYRTEC) 10 mg capsule Take 1 tablet by mouth once daily. 10/2/18   Historical Provider, MD   cholecalciferol (Vitamin D-3) 25 MCG (1000 UT) capsule Take 2 tablets by mouth once daily. 10/2/18   Historical Provider, MD   cyanocobalamin, vitamin B-12, (Vitamin B-12) 1,000 mcg tablet extended release Take 1 tablet (1,000 mcg) by mouth once daily. As directed 10/2/18   Historical Provider, MD   docosahexaenoic acid/epa (FISH OIL ORAL) Take 1,000 mg by mouth once daily as needed. 1 Capsule daily    Historical Provider, MD   fluticasone (Flonase) 50 mcg/actuation nasal spray Administer 1 spray into affected nostril(s) once daily. 10/2/18   Historical Provider, MD   montelukast (Singulair) 10 mg tablet Take 1 tablet (10 mg) by mouth once daily. As directed 9/16/23 9/15/24  [facility]   nebivolol (Bystolic) 5 mg tablet TAKE 1 TABLET DAILY. 2/9/24   [facility]   nitroglycerin (Nitrostat) 0.3 mg SL tablet Place 1 tablet (0.3 mg) under the tongue every 5 minutes if needed for chest pain. May repeat every 5 minutes for up to 3 doses. 4/9/24 4/9/25  Stephanie Oconnor MD   omeprazole (PriLOSEC) 20 mg DR capsule Take 1 capsule (20 mg) by mouth once daily. 9/12/23   Stephanie Oconnor MD   sildenafil (Viagra) 25 mg tablet TAKE 1 TABLET BY MOUTH DAILY 1 (ONE) hour before needed 4/24/24   Stephanie Oconnor MD       Physical Exam  Vitals:    05/14/24 0718   BP: 128/85    Pulse: 55   Resp: 12   Temp: 36.7 °C (98.1 °F)   SpO2: 95%      Weight   Vitals:    05/14/24 0718   Weight: 93 kg (205 lb)     BMI Body mass index is 27.35 kg/m².    General: A&Ox3, NAD.  HEENT: AT/NC.   CV: RRR. No murmur.  Resp: CTA bilaterally. No wheezing, rhonchi or rales.   GI: Soft, NT/ND.   Extrem: No edema.   Skin: No Jaundice.   Neuro: No focal deficits.   Psych: Normal mood and affect.        Oropharyngeal Classification IV (only hard palate visible)  ASA PS Classification 3  Sedation Plan: Deep Sedation.  Procedure Plan - pre-procedural (re)assesment completed by physician:  discharge/transfer patient when discharge criteria met    Eder Solis DO  5/14/2024 7:23 AM

## 2024-05-14 NOTE — DISCHARGE INSTRUCTIONS
Patient Instructions Post Procedure      The anesthetics, sedatives or narcotics which were given to you today will be acting in your body for the next 24 hours, so you might feel a little sleepy or groggy.  This feeling should slowly wear off. Carefully read and follow the instructions.     You received sedation today:  - Do not drive or operate any machinery or power tools of any kind.   - No alcoholic beverages today, not even beer or wine.  - Do not make any important decisions or sign any legal documents.  - No over the counter medications that contain alcohol or that may cause drowsiness.    While it is common to experience mild to moderate abdominal distention, gas, or belching after your procedure, if any of these symptoms occur following discharge from the GI Lab or within one week of having your procedure, call the [facility] to be advised whether a visit to your nearest Urgent Care or Emergency Department is indicated.  Take this paper with you if you go.   - If you develop an allergic reaction to the medications that were given during your procedure such as difficulty breathing, rash, hives, severe nausea, vomiting or lightheadedness.  - If you experience chest pain, shortness of breath, severe abdominal pain, fevers and chills.  -If you develop signs and symptoms of bleeding such as blood in your spit, if your stools turn black, tarry, or bloody  - If you have not urinated within 8 hours following your procedure.  - If your IV site becomes painful, red, inflamed, or looks infected.        Your physician recommends the additional following instructions:    -You have a contact number available for emergencies. The signs and symptoms of potential delayed complications were discussed with you. You may return to normal activities tomorrow.  -Resume your previous diet or other if specified.  -Continue your present medications.   -We are waiting for your pathology results, if applicable.  -The  findings and recommendations have been discussed with you and/or family.  - Please see Medication Reconciliation Form for new medication/medications prescribed.     If you experience any problems or have any questions following discharge from the GI Lab, please call: [phone] from 7 am- 4:30 pm.  In the event of an emergency please go to the closest Emergency Department or call Dr. Solis @ [phone]

## 2024-05-24 LAB
LABORATORY COMMENT REPORT: NORMAL
PATH REPORT.FINAL DX SPEC: NORMAL
PATH REPORT.GROSS SPEC: NORMAL
PATH REPORT.TOTAL CANCER: NORMAL

## 2024-06-04 ENCOUNTER — PATIENT MESSAGE (OUTPATIENT)
Dept: PRIMARY CARE | Facility: CLINIC | Age: 54
End: 2024-06-04
Payer: COMMERCIAL

## 2024-06-04 DIAGNOSIS — K57.90 DIVERTICULOSIS: ICD-10-CM

## 2024-06-04 RX ORDER — AMOXICILLIN AND CLAVULANATE POTASSIUM 875; 125 MG/1; MG/1
875 TABLET, FILM COATED ORAL 2 TIMES DAILY
Qty: 20 TABLET | Refills: 0 | Status: SHIPPED | OUTPATIENT
Start: 2024-06-04 | End: 2024-06-14

## 2024-06-04 NOTE — TELEPHONE ENCOUNTER
From: Tha Short  To: Lilo Srinivasan  Sent: 6/4/2024 11:55 AM EDT  Subject: Augmentin    Lilo I hate to bother you but [place] and realized I can’t find my Augmentin in case I need, can you have someone call in a new prescription to [facility], when you get time?    Thank you,    Tha Short  Cell [phone] cell

## 2024-10-27 DIAGNOSIS — J45.990 EXERCISE-INDUCED ASTHMA (HHS-HCC): ICD-10-CM

## 2024-10-28 RX ORDER — MONTELUKAST SODIUM 10 MG/1
TABLET ORAL
Qty: 90 TABLET | Refills: 3 | Status: SHIPPED | OUTPATIENT
Start: 2024-10-28

## 2024-10-30 DIAGNOSIS — K21.9 GERD WITHOUT ESOPHAGITIS: ICD-10-CM

## 2024-10-30 RX ORDER — OMEPRAZOLE 20 MG/1
20 CAPSULE, DELAYED RELEASE ORAL DAILY
Qty: 90 CAPSULE | Refills: 3 | Status: SHIPPED | OUTPATIENT
Start: 2024-10-30

## 2024-11-17 DIAGNOSIS — N52.9 MALE ERECTILE DYSFUNCTION, UNSPECIFIED: ICD-10-CM

## 2024-11-17 RX ORDER — SILDENAFIL 25 MG/1
TABLET, FILM COATED ORAL
Qty: 30 TABLET | Refills: 1 | Status: SHIPPED | OUTPATIENT
Start: 2024-11-17

## 2025-01-13 ENCOUNTER — PATIENT MESSAGE (OUTPATIENT)
Dept: PRIMARY CARE | Facility: CLINIC | Age: 55
End: 2025-01-13
Payer: COMMERCIAL

## 2025-01-13 DIAGNOSIS — K52.9 GASTROENTERITIS: Primary | ICD-10-CM

## 2025-01-13 RX ORDER — ONDANSETRON 4 MG/1
TABLET, FILM COATED ORAL
Qty: 30 TABLET | Refills: 0 | Status: SHIPPED | OUTPATIENT
Start: 2025-01-13

## 2025-01-13 RX ORDER — LOPERAMIDE HCL 2 MG
2 TABLET ORAL 4 TIMES DAILY PRN
Qty: 30 TABLET | Refills: 0 | Status: SHIPPED | OUTPATIENT
Start: 2025-01-13 | End: 2025-01-23

## 2025-02-26 ENCOUNTER — PATIENT MESSAGE (OUTPATIENT)
Dept: PRIMARY CARE | Facility: CLINIC | Age: 55
End: 2025-02-26
Payer: COMMERCIAL

## 2025-02-28 LAB
25(OH)D3+25(OH)D2 SERPL-MCNC: 53 NG/ML (ref 30–100)
ALBUMIN SERPL-MCNC: 4.5 G/DL (ref 3.6–5.1)
ALP SERPL-CCNC: 60 U/L (ref 35–144)
ALT SERPL-CCNC: 29 U/L (ref 9–46)
ANION GAP SERPL CALCULATED.4IONS-SCNC: 7 MMOL/L (CALC) (ref 7–17)
APPEARANCE UR: CLEAR
AST SERPL-CCNC: 26 U/L (ref 10–35)
BACTERIA #/AREA URNS HPF: NORMAL /HPF
BACTERIA UR CULT: NORMAL
BASOPHILS # BLD AUTO: 52 CELLS/UL (ref 0–200)
BASOPHILS NFR BLD AUTO: 0.9 %
BILIRUB SERPL-MCNC: 0.5 MG/DL (ref 0.2–1.2)
BILIRUB UR QL STRIP: NEGATIVE
BUN SERPL-MCNC: 16 MG/DL (ref 7–25)
CALCIUM SERPL-MCNC: 9.8 MG/DL (ref 8.6–10.3)
CHLORIDE SERPL-SCNC: 104 MMOL/L (ref 98–110)
CHOLEST SERPL-MCNC: 120 MG/DL
CHOLEST/HDLC SERPL: 2.6 (CALC)
CO2 SERPL-SCNC: 30 MMOL/L (ref 20–32)
COLOR UR: YELLOW
CREAT SERPL-MCNC: 0.99 MG/DL (ref 0.7–1.3)
EGFRCR SERPLBLD CKD-EPI 2021: 90 ML/MIN/1.73M2
EOSINOPHIL # BLD AUTO: 162 CELLS/UL (ref 15–500)
EOSINOPHIL NFR BLD AUTO: 2.8 %
ERYTHROCYTE [DISTWIDTH] IN BLOOD BY AUTOMATED COUNT: 12.1 % (ref 11–15)
GLUCOSE SERPL-MCNC: 96 MG/DL (ref 65–99)
GLUCOSE UR QL STRIP: NEGATIVE
HCT VFR BLD AUTO: 44.4 % (ref 38.5–50)
HDLC SERPL-MCNC: 46 MG/DL
HGB BLD-MCNC: 14.7 G/DL (ref 13.2–17.1)
HGB UR QL STRIP: NEGATIVE
HYALINE CASTS #/AREA URNS LPF: NORMAL /LPF
KETONES UR QL STRIP: NEGATIVE
LDLC SERPL CALC-MCNC: 56 MG/DL (CALC)
LEUKOCYTE ESTERASE UR QL STRIP: NEGATIVE
LYMPHOCYTES # BLD AUTO: 1502 CELLS/UL (ref 850–3900)
LYMPHOCYTES NFR BLD AUTO: 25.9 %
MCH RBC QN AUTO: 33.2 PG (ref 27–33)
MCHC RBC AUTO-ENTMCNC: 33.1 G/DL (ref 32–36)
MCV RBC AUTO: 100.2 FL (ref 80–100)
MONOCYTES # BLD AUTO: 534 CELLS/UL (ref 200–950)
MONOCYTES NFR BLD AUTO: 9.2 %
NEUTROPHILS # BLD AUTO: 3550 CELLS/UL (ref 1500–7800)
NEUTROPHILS NFR BLD AUTO: 61.2 %
NITRITE UR QL STRIP: NEGATIVE
NONHDLC SERPL-MCNC: 74 MG/DL (CALC)
PH UR STRIP: 6 [PH] (ref 5–8)
PLATELET # BLD AUTO: 216 THOUSAND/UL (ref 140–400)
PMV BLD REES-ECKER: 10 FL (ref 7.5–12.5)
POTASSIUM SERPL-SCNC: 4.6 MMOL/L (ref 3.5–5.3)
PROT SERPL-MCNC: 6.5 G/DL (ref 6.1–8.1)
PROT UR QL STRIP: NEGATIVE
RBC # BLD AUTO: 4.43 MILLION/UL (ref 4.2–5.8)
RBC #/AREA URNS HPF: NORMAL /HPF
SERVICE CMNT-IMP: NORMAL
SODIUM SERPL-SCNC: 141 MMOL/L (ref 135–146)
SP GR UR STRIP: 1.01 (ref 1–1.03)
SQUAMOUS #/AREA URNS HPF: NORMAL /HPF
T4 FREE SERPL-MCNC: 1.1 NG/DL (ref 0.8–1.8)
TRIGL SERPL-MCNC: 97 MG/DL
TSH SERPL-ACNC: 4.56 MIU/L (ref 0.4–4.5)
WBC # BLD AUTO: 5.8 THOUSAND/UL (ref 3.8–10.8)
WBC #/AREA URNS HPF: NORMAL /HPF

## 2025-03-10 ENCOUNTER — APPOINTMENT (OUTPATIENT)
Dept: PRIMARY CARE | Facility: CLINIC | Age: 55
End: 2025-03-10
Payer: COMMERCIAL

## 2025-03-10 VITALS
OXYGEN SATURATION: 96 % | WEIGHT: 215.5 LBS | DIASTOLIC BLOOD PRESSURE: 80 MMHG | TEMPERATURE: 97.1 F | BODY MASS INDEX: 28.56 KG/M2 | SYSTOLIC BLOOD PRESSURE: 126 MMHG | HEIGHT: 73 IN | HEART RATE: 57 BPM

## 2025-03-10 DIAGNOSIS — R93.1 ABNORMAL SCREENING CARDIAC CT: ICD-10-CM

## 2025-03-10 DIAGNOSIS — M25.511 BILATERAL SHOULDER PAIN, UNSPECIFIED CHRONICITY: ICD-10-CM

## 2025-03-10 DIAGNOSIS — Z12.5 SCREENING FOR PROSTATE CANCER: ICD-10-CM

## 2025-03-10 DIAGNOSIS — K21.9 GERD WITHOUT ESOPHAGITIS: ICD-10-CM

## 2025-03-10 DIAGNOSIS — Z00.00 ROUTINE HEALTH MAINTENANCE: Primary | ICD-10-CM

## 2025-03-10 DIAGNOSIS — I10 HYPERTENSION, ESSENTIAL: ICD-10-CM

## 2025-03-10 DIAGNOSIS — G47.33 MILD OBSTRUCTIVE SLEEP APNEA: ICD-10-CM

## 2025-03-10 DIAGNOSIS — E55.9 VITAMIN D DEFICIENCY: ICD-10-CM

## 2025-03-10 DIAGNOSIS — M25.512 BILATERAL SHOULDER PAIN, UNSPECIFIED CHRONICITY: ICD-10-CM

## 2025-03-10 DIAGNOSIS — J45.990 EXERCISE-INDUCED ASTHMA (HHS-HCC): ICD-10-CM

## 2025-03-10 DIAGNOSIS — E78.2 HYPERLIPIDEMIA, MIXED: ICD-10-CM

## 2025-03-10 DIAGNOSIS — Z80.0 FAMILY HISTORY OF PANCREATIC CANCER: ICD-10-CM

## 2025-03-10 DIAGNOSIS — I28.8 DILATION OF PULMONARY ARTERY (MULTI): ICD-10-CM

## 2025-03-10 DIAGNOSIS — I77.819 AORTIC ECTASIA: ICD-10-CM

## 2025-03-10 DIAGNOSIS — N52.9 ERECTILE DYSFUNCTION, UNSPECIFIED ERECTILE DYSFUNCTION TYPE: ICD-10-CM

## 2025-03-10 DIAGNOSIS — D75.89 MACROCYTOSIS: ICD-10-CM

## 2025-03-10 PROBLEM — R73.01 IFG (IMPAIRED FASTING GLUCOSE): Status: RESOLVED | Noted: 2023-02-21 | Resolved: 2025-03-10

## 2025-03-10 PROBLEM — R06.83 SNORING: Status: RESOLVED | Noted: 2024-04-08 | Resolved: 2025-03-10

## 2025-03-10 PROBLEM — I47.10 PSVT (PAROXYSMAL SUPRAVENTRICULAR TACHYCARDIA) (CMS-HCC): Status: RESOLVED | Noted: 2023-02-21 | Resolved: 2025-03-10

## 2025-03-10 PROBLEM — R00.2 PALPITATIONS: Status: RESOLVED | Noted: 2018-01-08 | Resolved: 2025-03-10

## 2025-03-10 PROCEDURE — 99396 PREV VISIT EST AGE 40-64: CPT | Performed by: INTERNAL MEDICINE

## 2025-03-10 PROCEDURE — 3079F DIAST BP 80-89 MM HG: CPT | Performed by: INTERNAL MEDICINE

## 2025-03-10 PROCEDURE — 3008F BODY MASS INDEX DOCD: CPT | Performed by: INTERNAL MEDICINE

## 2025-03-10 PROCEDURE — 3074F SYST BP LT 130 MM HG: CPT | Performed by: INTERNAL MEDICINE

## 2025-03-10 PROCEDURE — 1036F TOBACCO NON-USER: CPT | Performed by: INTERNAL MEDICINE

## 2025-03-10 RX ORDER — NITROGLYCERIN 0.3 MG/1
0.3 TABLET SUBLINGUAL EVERY 5 MIN PRN
Qty: 25 TABLET | Refills: 0 | Status: SHIPPED | OUTPATIENT
Start: 2025-03-10 | End: 2026-03-10

## 2025-03-10 RX ORDER — ATORVASTATIN CALCIUM 40 MG/1
40 TABLET, FILM COATED ORAL DAILY
Qty: 90 TABLET | Refills: 3 | Status: SHIPPED | OUTPATIENT
Start: 2025-03-10 | End: 2026-03-10

## 2025-03-10 RX ORDER — LOPERAMIDE HYDROCHLORIDE 2 MG/1
2 CAPSULE ORAL 4 TIMES DAILY PRN
COMMUNITY
Start: 2025-01-13 | End: 2025-03-10 | Stop reason: WASHOUT

## 2025-03-10 RX ORDER — NEBIVOLOL 5 MG/1
5 TABLET ORAL DAILY
Qty: 90 TABLET | Refills: 3 | Status: SHIPPED | OUTPATIENT
Start: 2025-03-10

## 2025-03-10 RX ORDER — OMEPRAZOLE 20 MG/1
20 CAPSULE, DELAYED RELEASE ORAL DAILY
Qty: 90 CAPSULE | Refills: 3 | Status: SHIPPED | OUTPATIENT
Start: 2025-03-10

## 2025-03-10 RX ORDER — SILDENAFIL 25 MG/1
25 TABLET, FILM COATED ORAL AS NEEDED
Qty: 30 TABLET | Refills: 1 | Status: SHIPPED | OUTPATIENT
Start: 2025-03-10

## 2025-03-10 RX ORDER — ALBUTEROL SULFATE 90 UG/1
INHALANT RESPIRATORY (INHALATION)
Qty: 18 G | Refills: 3 | Status: SHIPPED | OUTPATIENT
Start: 2025-03-10

## 2025-03-10 NOTE — PROGRESS NOTES
ANNUAL CPE VISIT  Chief Complaint   Patient presents with    Annual Exam     HPI: Reviewed chart/medical care received since last seen by me.  Saw Cardiology (copied)  Palpitations. On and off; low PAC and PVC density on a 2 week event monitor. Almost gone with nebivolol.  Hypertension.  Well-controlled.  Prevention of atherosclerosis. High CAC (>400). Normal myocardial perfusion imaging with excellent exercise capacity. To continue high dose atorvastatin; LDL well controlled.     Stress test 4/24:   1. Negative myocardial perfusion study without evidence of inducible  myocardial ischemia or prior infarction.  2. Mild dilatation of left ventricle  3. Normal LV wall motion with a post-stress LV EF estimated at 52%.    CTA chest 2024: 1. Upper normal aortic root dimension (3.7 cm). The ascending  thoracic aorta, arch and descending thoracic aorta are normal caliber.  2. There is no evidence for acute aortic pathology.    Hvaing trouble losing weight  Is on BB now  Drinking too much on weekends    Labs reviewed:  Component      Latest Ref Rng 2/27/2025   WHITE BLOOD CELL COUNT      3.8 - 10.8 Thousand/uL 5.8    RED BLOOD CELL COUNT      4.20 - 5.80 Million/uL 4.43    HEMOGLOBIN      13.2 - 17.1 g/dL 14.7    HEMATOCRIT      38.5 - 50.0 % 44.4    MCV      80.0 - 100.0 fL 100.2 (H)    MCH      27.0 - 33.0 pg 33.2 (H)    MCHC      32.0 - 36.0 g/dL 33.1    RDW      11.0 - 15.0 % 12.1    PLATELET COUNT      140 - 400 Thousand/uL 216    MPV      7.5 - 12.5 fL 10.0    ABSOLUTE NEUTROPHILS      1,500 - 7,800 cells/uL 3,550    ABSOLUTE LYMPHOCYTES      850 - 3,900 cells/uL 1,502    ABSOLUTE MONOCYTES      200 - 950 cells/uL 534    ABSOLUTE EOSINOPHILS      15 - 500 cells/uL 162    ABSOLUTE BASOPHILS      0 - 200 cells/uL 52    NEUTROPHILS      % 61.2    LYMPHOCYTES      % 25.9    MONOCYTES      % 9.2    EOSINOPHILS      % 2.8    BASOPHILS      % 0.9    COLOR      YELLOW  YELLOW    APPEARANCE      CLEAR  CLEAR    SPECIFIC  GRAVITY      1.001 - 1.035  1.011    PH      5.0 - 8.0  6.0    GLUCOSE      NEGATIVE  NEGATIVE    BILIRUBIN      NEGATIVE  NEGATIVE    KETONES      NEGATIVE  NEGATIVE    OCCULT BLOOD      NEGATIVE  NEGATIVE    PROTEIN      NEGATIVE  NEGATIVE    NITRITE      NEGATIVE  NEGATIVE    LEUKOCYTE ESTERASE      NEGATIVE  NEGATIVE    WBC      < OR = 5 /HPF NONE SEEN    RBC      < OR = 2 /HPF NONE SEEN    SQUAMOUS EPITHELIAL CELLS      < OR = 5 /HPF NONE SEEN    BACTERIA      NONE SEEN /HPF NONE SEEN    HYALINE CAST      NONE SEEN /LPF NONE SEEN    NOTE --    CULTURE, URINE, ROUTINE --    GLUCOSE      65 - 99 mg/dL 96    UREA NITROGEN (BUN)      7 - 25 mg/dL 16    CREATININE      0.70 - 1.30 mg/dL 0.99    EGFR      > OR = 60 mL/min/1.73m2 90    SODIUM      135 - 146 mmol/L 141    POTASSIUM      3.5 - 5.3 mmol/L 4.6    CHLORIDE      98 - 110 mmol/L 104    CARBON DIOXIDE      20 - 32 mmol/L 30    ELECTROLYTE BALANCE      7 - 17 mmol/L (calc) 7    CALCIUM      8.6 - 10.3 mg/dL 9.8    PROTEIN, TOTAL      6.1 - 8.1 g/dL 6.5    ALBUMIN      3.6 - 5.1 g/dL 4.5    BILIRUBIN, TOTAL      0.2 - 1.2 mg/dL 0.5    ALKALINE PHOSPHATASE      35 - 144 U/L 60    AST      10 - 35 U/L 26    ALT      9 - 46 U/L 29    CHOLESTEROL, TOTAL      <200 mg/dL 120    HDL CHOLESTEROL      > OR = 40 mg/dL 46    TRIGLYCERIDES      <150 mg/dL 97    LDL-CHOLESTEROL      mg/dL (calc) 56    CHOL/HDLC RATIO      <5.0 (calc) 2.6    NON HDL CHOLESTEROL      <130 mg/dL (calc) 74    TSH      0.40 - 4.50 mIU/L 4.56 (H)    T4, FREE      0.8 - 1.8 ng/dL 1.1    VITAMIN D,25-OH,TOTAL,IA      30 - 100 ng/mL 53         Assessment and Plan:  Problem List Items Addressed This Visit          High    Routine health maintenance - Primary    Overview     Flu vaccine 11/1/20, 11/18/22   TD Adult12/23/2004   Tdap (Age 7+)11/6/2012, 2/16/23  Moderna: 3/20/21, 4/17/21, Pfizer 12/30/21  Cscope 10/15/19 (5yrs); 5/14/24 (5yrs)  1/18/21 PSA 0.2; 2/15/23 0.27  Declines shingrix vaccines          Current Assessment & Plan     Annual Wellness exam completed   Preventive Health History reviewed  Ordered:   Labs             Relevant Orders    Comprehensive Metabolic Panel    CBC and Auto Differential    Lipid Panel    Urinalysis with Reflex Microscopic       Medium    Abnormal screening cardiac CT    Overview     CT calcium score: 10/2018- 113.78/89%   On statin, ASA  Goal LDL <70   CT Calcium Score 4/2024: Total 405.46  LM 0,  .85,  LCx 0,  .61,             Relevant Medications    nitroglycerin (Nitrostat) 0.3 mg SL tablet    Aortic ectasia    Overview     4/2024 CT cardiac scan finding: Ascending aortic ectasia measuring3.9 cm on axial images at the  level of the main pulmonary artery. Recommend follow-up radiation sparing cardiac MRI and thoracic MRA to assess status of the aortic valve and size and extent of the aneurysm in approximately 36-48 months.  CTA chest 4/24: 1. Upper normal aortic root dimension (3.7 cm). The ascending thoracic aorta, arch and descending thoracic aorta are normal caliber.  2. There is no evidence for acute aortic pathology.  Per UTD: 3.5 to 4.4 cm: Annual CT or MR angiography, echocardiogram to follow valvular disease (if needed)   Goal BP 120s/70s  On BB         Current Assessment & Plan     CTA if Cardiologist agees         BMI 28.0-28.9,adult    Current Assessment & Plan     Will try IF         Dilation of pulmonary artery (Multi)    Overview     4/2024 CT cardiac scan finding: Central main pulmonary artery appears dilated correlate with elevated right-sided filling pressures         Current Assessment & Plan     Will see if Cardiologist wants to get CTA         Erectile dysfunction    Overview     ? due to BB  Prn viagra          Relevant Medications    sildenafil (Viagra) 25 mg tablet    Exercise-induced asthma ([facility]-HCC)    Overview     Prn albuterol  On singulair         Relevant Medications    albuterol 90 mcg/actuation inhaler    GERD without  "esophagitis    Overview     stable on omeprazole         Relevant Medications    omeprazole (PriLOSEC) 20 mg DR capsule    Hyperlipidemia, mixed    Overview     10/4/18: CT calcium score: 113.78   Goal LDL <100   On statin and fish oil         Relevant Medications    atorvastatin (Lipitor) 40 mg tablet    Other Relevant Orders    TSH with reflex to Free T4 if abnormal    Hypertension, essential    Overview     <130/80 On ARb/diuretic in past On BB (for PVCs)  3/23: Our machine 130/84 P 57           His machine  132/87 P57  Controlled on BB; monitor          Current Assessment & Plan     Will check at home         Relevant Medications    nebivolol (Bystolic) 5 mg tablet    Other Relevant Orders    Albumin-Creatinine Ratio, Urine Random    Albumin-Creatinine Ratio, Urine Random    Macrocytosis    Overview     2/25: 100.2  B12/Folate normal  Likely ETOH effect         Mild obstructive sleep apnea    Overview     2018 PSG: Mild JENNIFER  On CPAP         Screening for prostate cancer    Relevant Orders    Prostate Specific Antigen, Screen    Vitamin D deficiency    Overview     2019: 33  Goal ~50         Relevant Orders    Vitamin D 25-Hydroxy,Total (for eval of Vitamin D levels)     Other Visit Diagnoses       Bilateral shoulder pain, unspecified chronicity        Relevant Orders    Referral to Physical Therapy    Family history of pancreatic cancer        Lucian do Galeri test              ROS otherwise negative aside from what was mentioned above in HPI.    Vitals  /80   Pulse 57   Temp 36.2 °C (97.1 °F)   Ht 1.842 m (6' 0.5\")   Wt 97.8 kg (215 lb 8 oz)   SpO2 96%   BMI 28.83 kg/m²   Body mass index is 28.83 kg/m².  Physical Exam  Gen: Alert, NAD  HEENT:  Normal exam  Neck:  No masses/nodes palpable; Thyroid nontender and without nodules; No POLO  Respiratory:  Lungs CTAB  CV: RRR  Neuro:  Gross motor and sensory intact  Skin:  No suspicious lesions present  Breast: No masses or axillary lymphadenopathy  TRACY: " Prostate not enlarged. No prostate mass or nodule(s) palpated, brown stool. (Pt declined chaperone)      Allergies and Medications  Lisinopril  Current Outpatient Medications   Medication Instructions    albuterol 90 mcg/actuation inhaler 1-2 puffs EVERY 4- 6 HOURS AS NEEDED FOR WHEEZING/SHORTNESS OF BREATH    atorvastatin (LIPITOR) 40 mg, oral, Daily    cetirizine (ZYRTEC) 10 mg capsule 1 tablet, Daily    cholecalciferol (Vitamin D-3) 25 MCG (1000 UT) capsule 2 tablets, Daily    cyanocobalamin, vitamin B-12, (Vitamin B-12) 1,000 mcg tablet extended release 1 tablet, Daily    fluticasone (Flonase) 50 mcg/actuation nasal spray 1 spray, Daily    nebivolol (BYSTOLIC) 5 mg, oral, Daily    nitroglycerin (NITROSTAT) 0.3 mg, sublingual, Every 5 min PRN, May repeat every 5 minutes for up to 3 doses.    omeprazole (PRILOSEC) 20 mg, oral, Daily    sildenafil (VIAGRA) 25 mg, oral, As needed       Active Problem List  Patient Active Problem List   Diagnosis    Allergic rhinitis    Chronic sinusitis    Exercise-induced asthma ([facility])    Grade I hemorrhoids    Hyperlipidemia, mixed    Hypertension, essential    Macrocytosis    Mild obstructive sleep apnea    Vitamin D deficiency    Abnormal screening cardiac CT    Erectile dysfunction    PVC (premature ventricular contraction)    Routine health maintenance    Cardiac risk counseling    Diverticulosis    GERD without esophagitis    BMI 28.0-28.9,adult    Screening for prostate cancer    Screening for colon cancer    Lumbar spondylosis    Aortic ectasia    Dilation of pulmonary artery (Multi)    Hiatal hernia    History of colonic polyps    Left inguinal hernia       Comprehensive Medical/Surgical/Social/Family History  Past Medical History:   Diagnosis Date    Abnormal screening cardiac CT     CT calcium score in 2018 = 113.78. In 2024 = 405.6    H/O cardiovascular stress test 01/01/2016    - The exercise stress echo was negative for ischemia at 95 % of MPHR (13.3 METS).  Good  functional capacity for age and gender.  - The left ventricle is normal in size. Left ventricular systolic function is  normal. EF = 63 ± 5% (2D biplane) Baseline left ventricular diastolic function is  normal.  - The right ventricle is normal in size. Right ventricular systolic function is  normal.    H/O cardiovascular stress test 04/26/2024    1. Negative myocardial perfusion study without evidence of inducible myocardial ischemia or prior infarction. 2. Mild dilatation of left ventricle 3. Normal LV wall motion with a post-stress LV EF estimated at 52%.    H/O CT scan of chest 04/26/2024    CTA chest: 1. Upper normal aortic root dimension (3.7 cm). The ascending thoracic aorta, arch and descending thoracic aorta are normal caliber. 2. There is no evidence for acute aortic pathology.    H/O echocardiogram 03/16/2022    - The left ventricle is normal in size. Left ventricular systolic function is  normal. EF = 55 ± 5% (visual est.)  - The right ventricle is normal in size. Right ventricular systolic function is  normal.  - Exam was compared with the prior  echocardiographic exam performed on  12/30/2016 (Stress). Comparable LV EF.    H/O magnetic resonance imaging     3/23: Unremarkable MRI of the pancreas. No evidence of pancreatic ductal dilatation or masses.    H/O x-ray of lumbar spine 03/02/2024    There is mild facet disease at L4-5 and L5-S1. There is mild spondylotic changes. There is no disc space narrowing. No fracture or spondylolisthesis    History of Holter monitoring 11/30/2022    Patient had a min HR of 43 bpm, max HR of 141 bpm, and avg HR of 72  bpm. Predominant underlying rhythm was Sinus Rhythm.  Isolated SVEs were rare (<1.0%), SVE Couplets  were rare (<1.0%), and SVE Triplets were rare (<1.0%). Isolated VEs were  rare (<1.0%, 7480), VE Couplets were rare (<1.0%, 40), and VE Triplets  were rare (<1.0%, 1). Ventricular Bigeminy and Trigeminy were present.     Past Surgical History:   Procedure  Laterality Date    COLONOSCOPY  10/15/2019    10/19: Impression:         - One small polyp in the cecum, removed with a cold                     snare. Resected and retrieved.                     - Moderate diverticulosis in the sigmoid colon and in the                     descending colon.                     - Small non-bleeding external and internal hemorrhoids.5y    COLONOSCOPY  2024    · 5 subcentimeter polyps were removed with cold snare. · Diverticulosis (few) in the descending and sigmoid colon. · Hemorrhoids.    HERNIA REPAIR  2020    laparoscopic. 2020 [place] Dr. Quan Smith: lap repair of incarcerated L inguinal hernia with mesh, SUSANA approach    NASAL ENDOSCOPY  2018    Polypectomy. rigid endoscopy ENT, TURBINATES:  bilateral, Moderate hypertrophy    OTHER SURGICAL HISTORY  2018    Spirometry.  (-)    POLYSOMNOGRAPHY      2018: mild stephania    SEPTOPLASTY  2018    Septoplasty    SKIN BIOPSY  2018    Precancerous skin lesion. atypical junctional melanocytic proliferation with predominant pagetoid features. An evolving melanoma in-situ is considered' reexcision- central acutely inflamed horn, acanthosis, entirely reactive and no melanocytic dysplasia    VASECTOMY  2018    Surgery Vas Deferens Vasectomy       Social History     Social History Narrative    Family History:    M: Alopecia, Anxiety, Dementia age 64 (Laura) ( age 72)    F: HTN, hyperlipidemia, CAD/stents age 60,CABG x 2, Gout, CRI/CKD Bile Duct/Pancreatic CA ( 78)    S:    S/P Genetic consult : Based on his father's history of exocrine pancreatic cancer, Mr. OQUENDO meets NCCN criteria for testing of pancreatic cancer susceptibility genes, including BRCA1 and BRCA2.     At the conclusion of our discussion today, Mr. OQUENDO opted not to proceed with genetic testing at this time in order to look into his luxury insurance coverage.    PGF: CAD ( age 60 MI)     PGM: Pancreatic CA    ___     Social History:     (Kayleen), 2 kids    [employer]    Nonsmoker    Social ETOH (weekends)

## 2025-03-13 LAB
ALBUMIN/CREAT UR: NORMAL MG/G CREAT
CREAT UR-MCNC: 36 MG/DL (ref 20–320)
MICROALBUMIN UR-MCNC: <0.2 MG/DL

## 2025-03-23 ENCOUNTER — PATIENT MESSAGE (OUTPATIENT)
Dept: PRIMARY CARE | Facility: CLINIC | Age: 55
End: 2025-03-23
Payer: COMMERCIAL

## 2025-03-23 DIAGNOSIS — I10 HYPERTENSION, ESSENTIAL: ICD-10-CM

## 2025-03-24 ENCOUNTER — TELEPHONE (OUTPATIENT)
Dept: PRIMARY CARE | Facility: CLINIC | Age: 55
End: 2025-03-24
Payer: COMMERCIAL

## 2025-03-24 DIAGNOSIS — I10 HYPERTENSION, ESSENTIAL: ICD-10-CM

## 2025-03-24 RX ORDER — OLMESARTAN MEDOXOMIL 5 MG/1
5 TABLET ORAL DAILY
Qty: 30 TABLET | Refills: 11 | Status: SHIPPED | OUTPATIENT
Start: 2025-03-24 | End: 2025-03-24 | Stop reason: SDUPTHER

## 2025-03-24 RX ORDER — OLMESARTAN MEDOXOMIL 5 MG/1
5 TABLET ORAL DAILY
Qty: 90 TABLET | Refills: 3 | Status: SHIPPED | OUTPATIENT
Start: 2025-03-24 | End: 2026-03-24

## 2025-03-24 NOTE — TELEPHONE ENCOUNTER
Received fax today from pharmacy asking for 90 day supply instead of 30 day  Send to [facility] in [place]

## 2025-05-01 DIAGNOSIS — E78.2 HYPERLIPIDEMIA, MIXED: ICD-10-CM

## 2025-05-01 RX ORDER — ATORVASTATIN CALCIUM 40 MG/1
40 TABLET, FILM COATED ORAL DAILY
Qty: 90 TABLET | Refills: 3 | Status: SHIPPED | OUTPATIENT
Start: 2025-05-01 | End: 2026-05-01

## 2025-05-01 NOTE — TELEPHONE ENCOUNTER
"Refill Request      Last OV with PCP 3/10/2025     Future Appointments       Date / Time Provider Department Dept Phone    4/10/2026 8:30 AM (Arrive by 8:15 AM) Stephanie Oconnor MD [facility] [phone]          Lab Results   Component Value Date    HGBA1C 5.2 10/26/2022     Lab Results   Component Value Date    CHOL 120 02/27/2025    CHOL 115 05/08/2024    CHOL 124 09/14/2023     Lab Results   Component Value Date    HDL 46 02/27/2025    HDL 38.0 05/08/2024    HDL 39.2 (A) 09/14/2023     Lab Results   Component Value Date    LDLCALC 56 02/27/2025    LDLCALC 49 05/08/2024     Lab Results   Component Value Date    TRIG 97 02/27/2025    TRIG 141 05/08/2024    TRIG 125 09/14/2023     No components found for: \"CHOLHDL\"  Lab Results   Component Value Date    TSH 4.56 (H) 02/27/2025     Lab Results   Component Value Date    GLUCOSE 96 02/27/2025    CALCIUM 9.8 02/27/2025     02/27/2025    K 4.6 02/27/2025    CO2 30 02/27/2025     02/27/2025    BUN 16 02/27/2025    CREATININE 0.99 02/27/2025       "

## 2026-04-10 ENCOUNTER — APPOINTMENT (OUTPATIENT)
Dept: PRIMARY CARE | Facility: CLINIC | Age: 56
End: 2026-04-10
Payer: COMMERCIAL